# Patient Record
Sex: FEMALE | Race: WHITE
[De-identification: names, ages, dates, MRNs, and addresses within clinical notes are randomized per-mention and may not be internally consistent; named-entity substitution may affect disease eponyms.]

---

## 2017-12-06 ENCOUNTER — HOSPITAL ENCOUNTER (OUTPATIENT)
Dept: HOSPITAL 53 - M LAB REF | Age: 77
End: 2017-12-06
Attending: UROLOGY
Payer: MEDICARE

## 2017-12-06 DIAGNOSIS — R15.9: Primary | ICD-10-CM

## 2017-12-06 DIAGNOSIS — Z79.899: ICD-10-CM

## 2018-01-25 ENCOUNTER — HOSPITAL ENCOUNTER (OUTPATIENT)
Dept: HOSPITAL 53 - M SDC | Age: 78
Discharge: HOME | End: 2018-01-25
Attending: UROLOGY
Payer: MEDICARE

## 2018-01-25 DIAGNOSIS — I25.2: ICD-10-CM

## 2018-01-25 DIAGNOSIS — E03.9: ICD-10-CM

## 2018-01-25 DIAGNOSIS — K21.9: ICD-10-CM

## 2018-01-25 DIAGNOSIS — E11.9: ICD-10-CM

## 2018-01-25 DIAGNOSIS — Z88.8: ICD-10-CM

## 2018-01-25 DIAGNOSIS — Z79.899: ICD-10-CM

## 2018-01-25 DIAGNOSIS — G47.30: ICD-10-CM

## 2018-01-25 DIAGNOSIS — R15.9: Primary | ICD-10-CM

## 2018-01-25 DIAGNOSIS — N39.3: ICD-10-CM

## 2018-01-25 DIAGNOSIS — Z98.61: ICD-10-CM

## 2018-01-25 DIAGNOSIS — Z88.2: ICD-10-CM

## 2018-01-25 DIAGNOSIS — Z79.51: ICD-10-CM

## 2018-01-25 DIAGNOSIS — Z79.82: ICD-10-CM

## 2018-01-25 DIAGNOSIS — E78.4: ICD-10-CM

## 2018-01-25 DIAGNOSIS — J45.909: ICD-10-CM

## 2018-01-25 DIAGNOSIS — I10: ICD-10-CM

## 2018-01-25 DIAGNOSIS — I25.10: ICD-10-CM

## 2018-01-25 LAB — GLUCOSE BLDC GLUCOMTR-MCNC: 109 MG/DL (ref 83–110)

## 2018-01-25 PROCEDURE — 64590 INS/RPL PRPH SAC/GSTR NPG/R: CPT

## 2018-01-25 RX ADMIN — LIDOCAINE HYDROCHLORIDE 1 ML: 10 INJECTION, SOLUTION EPIDURAL; INFILTRATION; INTRACAUDAL; PERINEURAL at 08:12

## 2018-01-25 RX ADMIN — CEFAZOLIN SODIUM 1 GM: 1 INJECTION, POWDER, FOR SOLUTION INTRAMUSCULAR; INTRAVENOUS at 08:13

## 2018-02-01 ENCOUNTER — HOSPITAL ENCOUNTER (OUTPATIENT)
Dept: HOSPITAL 53 - M SDC | Age: 78
Discharge: HOME | End: 2018-02-01
Attending: UROLOGY
Payer: MEDICARE

## 2018-02-01 DIAGNOSIS — E11.9: ICD-10-CM

## 2018-02-01 DIAGNOSIS — I25.10: ICD-10-CM

## 2018-02-01 DIAGNOSIS — G47.30: ICD-10-CM

## 2018-02-01 DIAGNOSIS — R80.9: ICD-10-CM

## 2018-02-01 DIAGNOSIS — Z88.5: ICD-10-CM

## 2018-02-01 DIAGNOSIS — Z95.5: ICD-10-CM

## 2018-02-01 DIAGNOSIS — K21.9: ICD-10-CM

## 2018-02-01 DIAGNOSIS — Z79.899: ICD-10-CM

## 2018-02-01 DIAGNOSIS — Z79.51: ICD-10-CM

## 2018-02-01 DIAGNOSIS — E03.9: ICD-10-CM

## 2018-02-01 DIAGNOSIS — Z88.8: ICD-10-CM

## 2018-02-01 DIAGNOSIS — J45.909: ICD-10-CM

## 2018-02-01 DIAGNOSIS — Z88.2: ICD-10-CM

## 2018-02-01 DIAGNOSIS — I11.9: ICD-10-CM

## 2018-02-01 DIAGNOSIS — I25.2: ICD-10-CM

## 2018-02-01 DIAGNOSIS — R15.9: ICD-10-CM

## 2018-02-01 DIAGNOSIS — Z79.84: ICD-10-CM

## 2018-02-01 DIAGNOSIS — L40.9: ICD-10-CM

## 2018-02-01 DIAGNOSIS — E78.5: ICD-10-CM

## 2018-02-01 DIAGNOSIS — Z79.82: ICD-10-CM

## 2018-02-01 DIAGNOSIS — R32: Primary | ICD-10-CM

## 2018-02-01 DIAGNOSIS — Z91.048: ICD-10-CM

## 2018-02-01 LAB — GLUCOSE BLDC GLUCOMTR-MCNC: 106 MG/DL (ref 83–110)

## 2018-02-01 PROCEDURE — 64590 INS/RPL PRPH SAC/GSTR NPG/R: CPT

## 2018-02-01 RX ADMIN — SODIUM CHLORIDE, POTASSIUM CHLORIDE, SODIUM LACTATE AND CALCIUM CHLORIDE 1 MLS/HR: 600; 310; 30; 20 INJECTION, SOLUTION INTRAVENOUS at 10:15

## 2018-02-01 RX ADMIN — CEFAZOLIN SODIUM 1 GM: 1 INJECTION, POWDER, FOR SOLUTION INTRAMUSCULAR; INTRAVENOUS at 11:54

## 2018-02-01 RX ADMIN — LIDOCAINE HYDROCHLORIDE 1 ML: 10 INJECTION, SOLUTION EPIDURAL; INFILTRATION; INTRACAUDAL; PERINEURAL at 11:50

## 2018-02-16 ENCOUNTER — HOSPITAL ENCOUNTER (OUTPATIENT)
Dept: HOSPITAL 53 - M LAB REF | Age: 78
End: 2018-02-16
Attending: UROLOGY
Payer: MEDICARE

## 2018-02-16 DIAGNOSIS — R15.9: Primary | ICD-10-CM

## 2018-02-16 LAB
AMORPH SED URNS QL MICRO: (no result)
APPEARANCE, URINE: (no result)
BACTERIA UR QL AUTO: (no result)
BILIRUBIN, URINE AUTO: NEGATIVE
BLOOD, URINE BLOOD: NEGATIVE
CAOX CRY URNS QL MICRO: (no result)
GLUCOSE, URINE (UA) AUTO: NEGATIVE MG/DL
KETONE, URINE AUTO: NEGATIVE MG/DL
LEUKOCYTE ESTERASE UR QL STRIP.AUTO: (no result)
MUCUS, URINE: (no result)
NITRITE, URINE AUTO: NEGATIVE
PH,URINE: 6 UNITS (ref 5–9)
PROT UR QL STRIP.AUTO: NEGATIVE MG/DL
RBC, URINE AUTO: 5 /HPF (ref 0–3)
SPECIFIC GRAVITY URINE AUTO: 1.02 (ref 1–1.03)
SQUAMOUS #/AREA URNS AUTO: 7 /HPF (ref 0–6)
TRANSITIONAL EPITHELIAL AUTO: 3 /HPF
UROBILINOGEN, URINE AUTO: 2 MG/DL (ref 0–2)
WBC, URINE AUTO: 9 /HPF (ref 0–3)

## 2018-02-16 PROCEDURE — 81001 URINALYSIS AUTO W/SCOPE: CPT

## 2018-04-18 ENCOUNTER — HOSPITAL ENCOUNTER (OUTPATIENT)
Dept: HOSPITAL 53 - M LAB REF | Age: 78
End: 2018-04-18
Attending: UROLOGY
Payer: MEDICARE

## 2018-04-18 DIAGNOSIS — N39.41: Primary | ICD-10-CM

## 2018-04-18 LAB
APPEARANCE, URINE: (no result)
BACTERIA UR QL AUTO: NEGATIVE
BILIRUBIN, URINE AUTO: NEGATIVE
BLOOD, URINE BLOOD: NEGATIVE
GLUCOSE, URINE (UA) AUTO: NEGATIVE MG/DL
KETONE, URINE AUTO: NEGATIVE MG/DL
LEUKOCYTE ESTERASE UR QL STRIP.AUTO: (no result)
MUCUS, URINE: (no result)
NITRITE, URINE AUTO: NEGATIVE
PH,URINE: 5 UNITS (ref 5–9)
PROT UR QL STRIP.AUTO: NEGATIVE MG/DL
RBC, URINE AUTO: 3 /HPF (ref 0–3)
SPECIFIC GRAVITY URINE AUTO: 1.01 (ref 1–1.03)
SQUAMOUS #/AREA URNS AUTO: 7 /HPF (ref 0–6)
TRANSITIONAL EPITHELIAL AUTO: 1 /HPF
UROBILINOGEN, URINE AUTO: 0.2 MG/DL (ref 0–2)
WBC, URINE AUTO: 5 /HPF (ref 0–3)

## 2018-04-18 PROCEDURE — 81001 URINALYSIS AUTO W/SCOPE: CPT

## 2018-05-10 ENCOUNTER — HOSPITAL ENCOUNTER (OUTPATIENT)
Dept: HOSPITAL 53 - M SDC | Age: 78
LOS: 1 days | Discharge: HOME | End: 2018-05-11
Attending: ORTHOPAEDIC SURGERY
Payer: MEDICARE

## 2018-05-10 DIAGNOSIS — M54.9: ICD-10-CM

## 2018-05-10 DIAGNOSIS — I11.9: ICD-10-CM

## 2018-05-10 DIAGNOSIS — R60.0: ICD-10-CM

## 2018-05-10 DIAGNOSIS — Z88.6: ICD-10-CM

## 2018-05-10 DIAGNOSIS — M75.111: Primary | ICD-10-CM

## 2018-05-10 DIAGNOSIS — Z79.899: ICD-10-CM

## 2018-05-10 DIAGNOSIS — E11.9: ICD-10-CM

## 2018-05-10 DIAGNOSIS — K21.9: ICD-10-CM

## 2018-05-10 DIAGNOSIS — F32.9: ICD-10-CM

## 2018-05-10 DIAGNOSIS — Z78.0: ICD-10-CM

## 2018-05-10 DIAGNOSIS — Z86.2: ICD-10-CM

## 2018-05-10 DIAGNOSIS — R23.3: ICD-10-CM

## 2018-05-10 DIAGNOSIS — Z88.8: ICD-10-CM

## 2018-05-10 DIAGNOSIS — J45.909: ICD-10-CM

## 2018-05-10 DIAGNOSIS — Z95.5: ICD-10-CM

## 2018-05-10 DIAGNOSIS — R06.83: ICD-10-CM

## 2018-05-10 DIAGNOSIS — E03.9: ICD-10-CM

## 2018-05-10 DIAGNOSIS — I25.10: ICD-10-CM

## 2018-05-10 DIAGNOSIS — Z79.82: ICD-10-CM

## 2018-05-10 DIAGNOSIS — Z87.440: ICD-10-CM

## 2018-05-10 DIAGNOSIS — E78.00: ICD-10-CM

## 2018-05-10 DIAGNOSIS — E66.9: ICD-10-CM

## 2018-05-10 DIAGNOSIS — Z88.5: ICD-10-CM

## 2018-05-10 DIAGNOSIS — Z87.891: ICD-10-CM

## 2018-05-10 DIAGNOSIS — R32: ICD-10-CM

## 2018-05-10 DIAGNOSIS — G47.33: ICD-10-CM

## 2018-05-10 DIAGNOSIS — M75.21: ICD-10-CM

## 2018-05-10 DIAGNOSIS — M12.9: ICD-10-CM

## 2018-05-10 DIAGNOSIS — H91.92: ICD-10-CM

## 2018-05-10 DIAGNOSIS — I25.2: ICD-10-CM

## 2018-05-10 LAB
ANION GAP: 7 MEQ/L (ref 8–16)
BLOOD UREA NITROGEN: 15 MG/DL (ref 7–18)
CALCIUM LEVEL: 9.4 MG/DL (ref 8.8–10.2)
CARBON DIOXIDE LEVEL: 28 MEQ/L (ref 21–32)
CHLORIDE LEVEL: 105 MEQ/L (ref 98–107)
CREATININE FOR GFR: 0.77 MG/DL (ref 0.55–1.3)
GFR SERPL CREATININE-BSD FRML MDRD: > 60 ML/MIN/{1.73_M2} (ref 39–?)
GLUCOSE BLDC GLUCOMTR-MCNC: 116 MG/DL (ref 83–110)
GLUCOSE BLDC GLUCOMTR-MCNC: 161 MG/DL (ref 83–110)
GLUCOSE, FASTING: 116 MG/DL (ref 70–100)
POTASSIUM SERUM: 4.2 MEQ/L (ref 3.5–5.1)
SODIUM LEVEL: 140 MEQ/L (ref 136–145)

## 2018-05-10 PROCEDURE — 29827 SHO ARTHRS SRG RT8TR CUF RPR: CPT

## 2018-05-10 RX ADMIN — RAMIPRIL 1 MG: 5 CAPSULE ORAL at 22:02

## 2018-05-10 RX ADMIN — SODIUM CHLORIDE, POTASSIUM CHLORIDE, SODIUM LACTATE AND CALCIUM CHLORIDE 1 MLS/HR: 600; 310; 30; 20 INJECTION, SOLUTION INTRAVENOUS at 20:08

## 2018-05-10 RX ADMIN — RANOLAZINE 1 MG: 500 TABLET, FILM COATED, EXTENDED RELEASE ORAL at 22:39

## 2018-05-10 RX ADMIN — ALBUTEROL SULFATE 1 PUFF: 90 AEROSOL, METERED RESPIRATORY (INHALATION) at 15:43

## 2018-05-10 RX ADMIN — FENTANYL CITRATE 1 MCG: 50 INJECTION, SOLUTION INTRAMUSCULAR; INTRAVENOUS at 07:21

## 2018-05-10 RX ADMIN — FLUTICASONE PROPIONATE AND SALMETEROL XINAFOATE 1 PUFF: 230; 21 AEROSOL, METERED RESPIRATORY (INHALATION) at 20:39

## 2018-05-10 RX ADMIN — FLUTICASONE PROPIONATE 1 SPRAY: 50 SPRAY, METERED NASAL at 22:39

## 2018-05-10 RX ADMIN — DEXTROSE MONOHYDRATE 1 MG: 50 INJECTION, SOLUTION INTRAVENOUS at 08:19

## 2018-05-10 RX ADMIN — SODIUM CHLORIDE, PRESERVATIVE FREE 1 ML: 5 INJECTION INTRAVENOUS at 22:03

## 2018-05-10 RX ADMIN — GABAPENTIN 1 MG: 300 CAPSULE ORAL at 22:00

## 2018-05-10 RX ADMIN — SODIUM CHLORIDE, POTASSIUM CHLORIDE, SODIUM LACTATE AND CALCIUM CHLORIDE 1 MLS/HR: 600; 310; 30; 20 INJECTION, SOLUTION INTRAVENOUS at 06:53

## 2018-05-10 RX ADMIN — TIOTROPIUM BROMIDE 1 INHALATION: 18 CAPSULE ORAL; RESPIRATORY (INHALATION) at 20:40

## 2018-05-10 RX ADMIN — ALBUTEROL SULFATE 1 MG: 2.5 SOLUTION RESPIRATORY (INHALATION) at 10:00

## 2018-05-10 RX ADMIN — MIDAZOLAM 1 MG: 1 INJECTION INTRAMUSCULAR; INTRAVENOUS at 07:21

## 2018-05-11 RX ADMIN — CETIRIZINE HYDROCHLORIDE 1 MG: 10 TABLET, FILM COATED ORAL at 09:09

## 2018-05-11 RX ADMIN — RANOLAZINE 1 MG: 500 TABLET, FILM COATED, EXTENDED RELEASE ORAL at 09:08

## 2018-05-11 RX ADMIN — FLUTICASONE PROPIONATE AND SALMETEROL XINAFOATE 1 PUFF: 230; 21 AEROSOL, METERED RESPIRATORY (INHALATION) at 07:27

## 2018-05-11 RX ADMIN — GABAPENTIN 1 MG: 300 CAPSULE ORAL at 09:11

## 2018-05-11 RX ADMIN — SODIUM CHLORIDE, PRESERVATIVE FREE 1 ML: 5 INJECTION INTRAVENOUS at 06:19

## 2018-05-11 RX ADMIN — PANTOPRAZOLE SODIUM 1 MG: 40 TABLET, DELAYED RELEASE ORAL at 09:11

## 2018-05-11 RX ADMIN — BISOPROLOL FUMARATE 1 MG: 5 TABLET ORAL at 09:11

## 2018-05-11 RX ADMIN — ACETAMINOPHEN 1 MG: 500 TABLET ORAL at 06:50

## 2018-05-11 RX ADMIN — LEVOTHYROXINE SODIUM 1 MCG: 112 TABLET ORAL at 06:18

## 2018-06-01 ENCOUNTER — HOSPITAL ENCOUNTER (OUTPATIENT)
Dept: HOSPITAL 53 - M LAB REF | Age: 78
End: 2018-06-01
Attending: UROLOGY
Payer: MEDICARE

## 2018-06-01 DIAGNOSIS — Z79.899: ICD-10-CM

## 2018-06-01 DIAGNOSIS — Z87.440: ICD-10-CM

## 2018-06-01 DIAGNOSIS — Z09: Primary | ICD-10-CM

## 2018-06-01 LAB
APPEARANCE, URINE: (no result)
BACTERIA UR QL AUTO: NEGATIVE
BILIRUBIN, URINE AUTO: NEGATIVE
BLOOD, URINE BLOOD: NEGATIVE
GLUCOSE, URINE (UA) AUTO: NEGATIVE MG/DL
KETONE, URINE AUTO: NEGATIVE MG/DL
LEUKOCYTE ESTERASE UR QL STRIP.AUTO: (no result)
MUCUS, URINE: (no result)
NITRITE, URINE AUTO: NEGATIVE
PH,URINE: 5 UNITS (ref 5–9)
PROT UR QL STRIP.AUTO: NEGATIVE MG/DL
RBC, URINE AUTO: 3 /HPF (ref 0–3)
SPECIFIC GRAVITY URINE AUTO: 1.02 (ref 1–1.03)
SQUAMOUS #/AREA URNS AUTO: 1 /HPF (ref 0–6)
UROBILINOGEN, URINE AUTO: 0.2 MG/DL (ref 0–2)
WBC, URINE AUTO: 1 /HPF (ref 0–3)

## 2018-06-01 PROCEDURE — 81001 URINALYSIS AUTO W/SCOPE: CPT

## 2018-10-30 ENCOUNTER — HOSPITAL ENCOUNTER (OUTPATIENT)
Dept: HOSPITAL 53 - M LAB | Age: 78
End: 2018-10-30
Attending: ORTHOPAEDIC SURGERY
Payer: MEDICARE

## 2018-10-30 DIAGNOSIS — M17.12: ICD-10-CM

## 2018-10-30 DIAGNOSIS — Z01.818: Primary | ICD-10-CM

## 2018-10-30 LAB
ALBUMIN/GLOBULIN RATIO: 1.03 (ref 1–1.93)
ALBUMIN: 3.6 GM/DL (ref 3.2–5.2)
ALKALINE PHOSPHATASE: 62 U/L (ref 45–117)
ALT SERPL W P-5'-P-CCNC: 21 U/L (ref 12–78)
ANION GAP: 3 MEQ/L (ref 8–16)
AST SERPL-CCNC: 25 U/L (ref 7–37)
BILIRUBIN,TOTAL: 0.5 MG/DL (ref 0.2–1)
BLOOD UREA NITROGEN: 17 MG/DL (ref 7–18)
CALCIUM LEVEL: 9.4 MG/DL (ref 8.8–10.2)
CARBON DIOXIDE LEVEL: 32 MEQ/L (ref 21–32)
CHLORIDE LEVEL: 106 MEQ/L (ref 98–107)
CREATININE FOR GFR: 0.74 MG/DL (ref 0.55–1.3)
ERYTHROCYTE SEDIMENTATION RATE: 48 MM/HR (ref 0–30)
GFR SERPL CREATININE-BSD FRML MDRD: > 60 ML/MIN/{1.73_M2} (ref 39–?)
GLUCOSE, FASTING: 107 MG/DL (ref 70–100)
HEMATOCRIT: 35.4 % (ref 36–47)
HEMOGLOBIN: 11.4 G/DL (ref 12–15.5)
INR: 0.99
MEAN CORPUSCULAR HEMOGLOBIN: 30.6 PG (ref 27–33)
MEAN CORPUSCULAR HGB CONC: 32.2 G/DL (ref 32–36.5)
MEAN CORPUSCULAR VOLUME: 95.2 FL (ref 80–96)
NRBC BLD AUTO-RTO: 0 % (ref 0–0)
PLATELET COUNT, AUTOMATED: 273 10^3/UL (ref 150–450)
POTASSIUM SERUM: 4.7 MEQ/L (ref 3.5–5.1)
PROTHROMBIN TIME: 13.2 SECONDS (ref 12.1–14.4)
RED BLOOD COUNT: 3.72 10^6/UL (ref 4–5.4)
RED CELL DISTRIBUTION WIDTH: 15.8 % (ref 11.5–14.5)
SODIUM LEVEL: 141 MEQ/L (ref 136–145)
TOTAL PROTEIN: 7.1 GM/DL (ref 6.4–8.2)
WHITE BLOOD COUNT: 7.3 10^3/UL (ref 4–10)

## 2018-10-30 PROCEDURE — 71046 X-RAY EXAM CHEST 2 VIEWS: CPT

## 2018-11-27 ENCOUNTER — HOSPITAL ENCOUNTER (INPATIENT)
Dept: HOSPITAL 53 - M OR | Age: 78
LOS: 3 days | Discharge: NURSING, REHAB OR LONG-TERM CARE | DRG: 470 | End: 2018-11-30
Attending: ORTHOPAEDIC SURGERY | Admitting: ORTHOPAEDIC SURGERY
Payer: MEDICARE

## 2018-11-27 DIAGNOSIS — I10: ICD-10-CM

## 2018-11-27 DIAGNOSIS — R26.89: ICD-10-CM

## 2018-11-27 DIAGNOSIS — Z79.82: ICD-10-CM

## 2018-11-27 DIAGNOSIS — Z95.5: ICD-10-CM

## 2018-11-27 DIAGNOSIS — Z79.899: ICD-10-CM

## 2018-11-27 DIAGNOSIS — Z79.84: ICD-10-CM

## 2018-11-27 DIAGNOSIS — J45.909: ICD-10-CM

## 2018-11-27 DIAGNOSIS — M17.12: Primary | ICD-10-CM

## 2018-11-27 DIAGNOSIS — R33.9: ICD-10-CM

## 2018-11-27 DIAGNOSIS — F39: ICD-10-CM

## 2018-11-27 DIAGNOSIS — E03.9: ICD-10-CM

## 2018-11-27 DIAGNOSIS — G47.33: ICD-10-CM

## 2018-11-27 DIAGNOSIS — T40.605A: ICD-10-CM

## 2018-11-27 DIAGNOSIS — E78.00: ICD-10-CM

## 2018-11-27 DIAGNOSIS — I95.9: ICD-10-CM

## 2018-11-27 DIAGNOSIS — E11.40: ICD-10-CM

## 2018-11-27 DIAGNOSIS — K21.9: ICD-10-CM

## 2018-11-27 DIAGNOSIS — T46.5X5A: ICD-10-CM

## 2018-11-27 DIAGNOSIS — Z90.49: ICD-10-CM

## 2018-11-27 DIAGNOSIS — I25.2: ICD-10-CM

## 2018-11-27 DIAGNOSIS — I25.10: ICD-10-CM

## 2018-11-27 DIAGNOSIS — R09.02: ICD-10-CM

## 2018-11-27 DIAGNOSIS — J44.9: ICD-10-CM

## 2018-11-27 LAB
GLUCOSE BLDC GLUCOMTR-MCNC: 118 MG/DL (ref 83–110)
GLUCOSE BLDC GLUCOMTR-MCNC: 142 MG/DL (ref 83–110)

## 2018-11-27 PROCEDURE — 0SRD0J9 REPLACEMENT OF LEFT KNEE JOINT WITH SYNTHETIC SUBSTITUTE, CEMENTED, OPEN APPROACH: ICD-10-PCS

## 2018-11-27 RX ADMIN — CEFAZOLIN SODIUM 1 GM: 1 INJECTION, POWDER, FOR SOLUTION INTRAMUSCULAR; INTRAVENOUS at 11:21

## 2018-11-27 RX ADMIN — FLUTICASONE PROPIONATE AND SALMETEROL XINAFOATE 1 PUFF: 230; 21 AEROSOL, METERED RESPIRATORY (INHALATION) at 19:53

## 2018-11-27 RX ADMIN — FENTANYL CITRATE 1 MCG: 50 INJECTION, SOLUTION INTRAMUSCULAR; INTRAVENOUS at 09:34

## 2018-11-27 RX ADMIN — DEXTROSE MONOHYDRATE 1 MG: 50 INJECTION, SOLUTION INTRAVENOUS at 11:20

## 2018-11-27 RX ADMIN — BUPIVACAINE HYDROCHLORIDE 1 ML: 2.5 INJECTION, SOLUTION EPIDURAL; INFILTRATION; INTRACAUDAL at 11:18

## 2018-11-27 RX ADMIN — ONDANSETRON 1 MG: 2 INJECTION INTRAMUSCULAR; INTRAVENOUS at 23:34

## 2018-11-27 RX ADMIN — GABAPENTIN 1 MG: 300 CAPSULE ORAL at 20:48

## 2018-11-27 RX ADMIN — BUPIVACAINE 1 ML: 13.3 INJECTION, SUSPENSION, LIPOSOMAL INFILTRATION at 11:21

## 2018-11-27 RX ADMIN — SODIUM CHLORIDE, POTASSIUM CHLORIDE, SODIUM LACTATE AND CALCIUM CHLORIDE 1 MLS/HR: 600; 310; 30; 20 INJECTION, SOLUTION INTRAVENOUS at 13:15

## 2018-11-27 RX ADMIN — RANOLAZINE 1 MG: 500 TABLET, FILM COATED, EXTENDED RELEASE ORAL at 20:48

## 2018-11-27 RX ADMIN — SODIUM CHLORIDE, POTASSIUM CHLORIDE, SODIUM LACTATE AND CALCIUM CHLORIDE 1 MLS/HR: 600; 310; 30; 20 INJECTION, SOLUTION INTRAVENOUS at 09:00

## 2018-11-27 RX ADMIN — MIDAZOLAM 1 MG: 1 INJECTION INTRAMUSCULAR; INTRAVENOUS at 09:34

## 2018-11-27 RX ADMIN — ACETAMINOPHEN 1 MG: 325 TABLET ORAL at 22:33

## 2018-11-28 LAB
ANION GAP: 7 MEQ/L (ref 8–16)
BLOOD UREA NITROGEN: 17 MG/DL (ref 7–18)
CALCIUM LEVEL: 8.7 MG/DL (ref 8.8–10.2)
CARBON DIOXIDE LEVEL: 27 MEQ/L (ref 21–32)
CHLORIDE LEVEL: 101 MEQ/L (ref 98–107)
CK MB CFR.DF SERPL CALC: 0.82
CK SERPL-CCNC: 291 U/L (ref 26–192)
CK-MB VALUE MASS: 2 NG/ML (ref ?–3.6)
CREATININE FOR GFR: 1.32 MG/DL (ref 0.55–1.3)
GFR SERPL CREATININE-BSD FRML MDRD: 41.5 ML/MIN/{1.73_M2} (ref 39–?)
GLUCOSE BLDC GLUCOMTR-MCNC: 127 MG/DL (ref 83–110)
GLUCOSE BLDC GLUCOMTR-MCNC: 138 MG/DL (ref 83–110)
GLUCOSE BLDC GLUCOMTR-MCNC: 144 MG/DL (ref 83–110)
GLUCOSE, FASTING: 122 MG/DL (ref 70–100)
HEMATOCRIT: 29.5 % (ref 36–47)
HEMATOCRIT: 33.1 % (ref 36–47)
HEMOGLOBIN: 10.5 G/DL (ref 12–15.5)
HEMOGLOBIN: 9.5 G/DL (ref 12–15.5)
INR: 1.13
LACTIC ACID SEPSIS PROTOCOL: 1.6 MMOL/L (ref 0.4–2)
LACTIC ACID SEPSIS PROTOCOL: 3.2 MMOL/L (ref 0.4–2)
MEAN CORPUSCULAR HEMOGLOBIN: 30.3 PG (ref 27–33)
MEAN CORPUSCULAR HEMOGLOBIN: 30.7 PG (ref 27–33)
MEAN CORPUSCULAR HGB CONC: 31.7 G/DL (ref 32–36.5)
MEAN CORPUSCULAR HGB CONC: 32.2 G/DL (ref 32–36.5)
MEAN CORPUSCULAR VOLUME: 95.5 FL (ref 80–96)
MEAN CORPUSCULAR VOLUME: 95.7 FL (ref 80–96)
NRBC BLD AUTO-RTO: 0 % (ref 0–0)
NRBC BLD AUTO-RTO: 0.2 % (ref 0–0)
PLATELET COUNT, AUTOMATED: 258 10^3/UL (ref 150–450)
PLATELET COUNT, AUTOMATED: 282 10^3/UL (ref 150–450)
POTASSIUM SERUM: 4.6 MEQ/L (ref 3.5–5.1)
PROTHROMBIN TIME: 14.7 SECONDS (ref 12.1–14.4)
RED BLOOD COUNT: 3.09 10^6/UL (ref 4–5.4)
RED BLOOD COUNT: 3.46 10^6/UL (ref 4–5.4)
RED CELL DISTRIBUTION WIDTH: 16.5 % (ref 11.5–14.5)
RED CELL DISTRIBUTION WIDTH: 16.8 % (ref 11.5–14.5)
SODIUM LEVEL: 135 MEQ/L (ref 136–145)
THYROID STIMULATING HORMONE: 1.51 UIU/ML (ref 0.36–3.74)
TROPONIN I: < 0.02 NG/ML (ref ?–0.1)
WHITE BLOOD COUNT: 11.3 10^3/UL (ref 4–10)
WHITE BLOOD COUNT: 11.7 10^3/UL (ref 4–10)

## 2018-11-28 RX ADMIN — SODIUM CHLORIDE 1 MLS/HR: 9 INJECTION, SOLUTION INTRAVENOUS at 18:17

## 2018-11-28 RX ADMIN — RANOLAZINE 1 MG: 500 TABLET, FILM COATED, EXTENDED RELEASE ORAL at 20:53

## 2018-11-28 RX ADMIN — BISOPROLOL FUMARATE 1 MG: 5 TABLET ORAL at 08:27

## 2018-11-28 RX ADMIN — INSULIN LISPRO 2 UNITS: 100 INJECTION, SOLUTION INTRAVENOUS; SUBCUTANEOUS at 18:16

## 2018-11-28 RX ADMIN — PANTOPRAZOLE SODIUM 1 MG: 40 TABLET, DELAYED RELEASE ORAL at 08:28

## 2018-11-28 RX ADMIN — ACETAMINOPHEN 1 MG: 500 TABLET ORAL at 22:12

## 2018-11-28 RX ADMIN — FLUTICASONE PROPIONATE 1 SPRAY: 50 SPRAY, METERED NASAL at 08:29

## 2018-11-28 RX ADMIN — ASPIRIN 1 MG: 81 TABLET ORAL at 08:29

## 2018-11-28 RX ADMIN — RIVAROXABAN 1 MG: 10 TABLET, FILM COATED ORAL at 22:12

## 2018-11-28 RX ADMIN — RANOLAZINE 1 MG: 500 TABLET, FILM COATED, EXTENDED RELEASE ORAL at 08:28

## 2018-11-28 RX ADMIN — LEVOTHYROXINE SODIUM 1 MCG: 112 TABLET ORAL at 05:06

## 2018-11-28 RX ADMIN — FLUTICASONE PROPIONATE AND SALMETEROL XINAFOATE 1 PUFF: 230; 21 AEROSOL, METERED RESPIRATORY (INHALATION) at 20:47

## 2018-11-28 RX ADMIN — ACETAMINOPHEN 1 MG: 500 TABLET ORAL at 10:10

## 2018-11-28 RX ADMIN — IPRATROPIUM BROMIDE AND ALBUTEROL SULFATE 1 ML: .5; 3 SOLUTION RESPIRATORY (INHALATION) at 20:00

## 2018-11-28 RX ADMIN — SODIUM CHLORIDE, POTASSIUM CHLORIDE, SODIUM LACTATE AND CALCIUM CHLORIDE 1 MLS/HR: 600; 310; 30; 20 INJECTION, SOLUTION INTRAVENOUS at 01:43

## 2018-11-28 RX ADMIN — DOCUSATE SODIUM,SENNOSIDES 1 TAB: 50; 8.6 TABLET, FILM COATED ORAL at 20:53

## 2018-11-28 RX ADMIN — GABAPENTIN 1 MG: 300 CAPSULE ORAL at 20:53

## 2018-11-28 RX ADMIN — SODIUM CHLORIDE 1 ML: 9 INJECTION, SOLUTION INTRAVENOUS at 15:50

## 2018-11-28 RX ADMIN — DOCUSATE SODIUM,SENNOSIDES 1 TAB: 50; 8.6 TABLET, FILM COATED ORAL at 08:28

## 2018-11-28 RX ADMIN — NALOXONE HYDROCHLORIDE 1 MG: 0.4 INJECTION, SOLUTION INTRAMUSCULAR; INTRAVENOUS; SUBCUTANEOUS at 09:47

## 2018-11-28 RX ADMIN — NALOXONE HYDROCHLORIDE 1 MG: 0.4 INJECTION, SOLUTION INTRAMUSCULAR; INTRAVENOUS; SUBCUTANEOUS at 13:40

## 2018-11-28 RX ADMIN — MAGNESIUM HYDROXIDE 1 ML: 400 SUSPENSION ORAL at 08:27

## 2018-11-28 RX ADMIN — RAMIPRIL 1 MG: 5 CAPSULE ORAL at 08:29

## 2018-11-28 RX ADMIN — SODIUM CHLORIDE 1 MLS/HR: 9 INJECTION, SOLUTION INTRAVENOUS at 13:41

## 2018-11-28 RX ADMIN — POLYETHYLENE GLYCOL 3350 1 PKT: 17 POWDER, FOR SOLUTION ORAL at 08:28

## 2018-11-28 RX ADMIN — FLUTICASONE PROPIONATE AND SALMETEROL XINAFOATE 1 PUFF: 230; 21 AEROSOL, METERED RESPIRATORY (INHALATION) at 08:06

## 2018-11-28 RX ADMIN — SODIUM CHLORIDE 1 MLS/HR: 9 INJECTION, SOLUTION INTRAVENOUS at 17:12

## 2018-11-28 RX ADMIN — GABAPENTIN 1 MG: 300 CAPSULE ORAL at 08:28

## 2018-11-28 RX ADMIN — CETIRIZINE HYDROCHLORIDE 1 MG: 10 TABLET, FILM COATED ORAL at 08:28

## 2018-11-28 RX ADMIN — ISOSORBIDE MONONITRATE 1 MG: 30 TABLET, EXTENDED RELEASE ORAL at 08:29

## 2018-11-28 RX ADMIN — TIOTROPIUM BROMIDE 1 INHALATION: 18 CAPSULE ORAL; RESPIRATORY (INHALATION) at 08:07

## 2018-11-29 LAB
ANION GAP: 5 MEQ/L (ref 8–16)
ANION GAP: 7 MEQ/L (ref 8–16)
BLOOD UREA NITROGEN: 15 MG/DL (ref 7–18)
BLOOD UREA NITROGEN: 17 MG/DL (ref 7–18)
CALCIUM LEVEL: 8.1 MG/DL (ref 8.8–10.2)
CALCIUM LEVEL: 8.5 MG/DL (ref 8.8–10.2)
CARBON DIOXIDE LEVEL: 26 MEQ/L (ref 21–32)
CARBON DIOXIDE LEVEL: 28 MEQ/L (ref 21–32)
CHLORIDE LEVEL: 105 MEQ/L (ref 98–107)
CHLORIDE LEVEL: 105 MEQ/L (ref 98–107)
CREATININE FOR GFR: 0.81 MG/DL (ref 0.55–1.3)
CREATININE FOR GFR: 1.02 MG/DL (ref 0.55–1.3)
GFR SERPL CREATININE-BSD FRML MDRD: 55.9 ML/MIN/{1.73_M2} (ref 39–?)
GFR SERPL CREATININE-BSD FRML MDRD: > 60 ML/MIN/{1.73_M2} (ref 39–?)
GLUCOSE BLDC GLUCOMTR-MCNC: 133 MG/DL (ref 83–110)
GLUCOSE BLDC GLUCOMTR-MCNC: 139 MG/DL (ref 83–110)
GLUCOSE, FASTING: 129 MG/DL (ref 70–100)
GLUCOSE, FASTING: 137 MG/DL (ref 70–100)
HEMATOCRIT: 30 % (ref 36–47)
HEMOGLOBIN: 9.5 G/DL (ref 12–15.5)
MEAN CORPUSCULAR HEMOGLOBIN: 30.4 PG (ref 27–33)
MEAN CORPUSCULAR HGB CONC: 31.7 G/DL (ref 32–36.5)
MEAN CORPUSCULAR VOLUME: 95.8 FL (ref 80–96)
NRBC BLD AUTO-RTO: 0 % (ref 0–0)
PLATELET COUNT, AUTOMATED: 215 10^3/UL (ref 150–450)
POTASSIUM SERUM: 4.4 MEQ/L (ref 3.5–5.1)
POTASSIUM SERUM: 4.5 MEQ/L (ref 3.5–5.1)
RED BLOOD COUNT: 3.13 10^6/UL (ref 4–5.4)
RED CELL DISTRIBUTION WIDTH: 16.4 % (ref 11.5–14.5)
SODIUM LEVEL: 138 MEQ/L (ref 136–145)
SODIUM LEVEL: 138 MEQ/L (ref 136–145)
WHITE BLOOD COUNT: 9.7 10^3/UL (ref 4–10)

## 2018-11-29 RX ADMIN — GABAPENTIN 1 MG: 300 CAPSULE ORAL at 20:51

## 2018-11-29 RX ADMIN — FLUTICASONE PROPIONATE 1 SPRAY: 50 SPRAY, METERED NASAL at 08:20

## 2018-11-29 RX ADMIN — FLUTICASONE PROPIONATE AND SALMETEROL XINAFOATE 1 PUFF: 230; 21 AEROSOL, METERED RESPIRATORY (INHALATION) at 19:39

## 2018-11-29 RX ADMIN — ACETAMINOPHEN 1 MG: 500 TABLET ORAL at 08:20

## 2018-11-29 RX ADMIN — IPRATROPIUM BROMIDE AND ALBUTEROL SULFATE 1 ML: .5; 3 SOLUTION RESPIRATORY (INHALATION) at 08:00

## 2018-11-29 RX ADMIN — IPRATROPIUM BROMIDE AND ALBUTEROL SULFATE 1 ML: .5; 3 SOLUTION RESPIRATORY (INHALATION) at 15:40

## 2018-11-29 RX ADMIN — RANOLAZINE 1 MG: 500 TABLET, FILM COATED, EXTENDED RELEASE ORAL at 08:17

## 2018-11-29 RX ADMIN — ASPIRIN 1 MG: 81 TABLET ORAL at 08:20

## 2018-11-29 RX ADMIN — RIVAROXABAN 1 MG: 10 TABLET, FILM COATED ORAL at 17:46

## 2018-11-29 RX ADMIN — IPRATROPIUM BROMIDE AND ALBUTEROL SULFATE 1 ML: .5; 3 SOLUTION RESPIRATORY (INHALATION) at 12:22

## 2018-11-29 RX ADMIN — RANOLAZINE 1 MG: 500 TABLET, FILM COATED, EXTENDED RELEASE ORAL at 20:51

## 2018-11-29 RX ADMIN — GABAPENTIN 1 MG: 300 CAPSULE ORAL at 08:18

## 2018-11-29 RX ADMIN — POLYETHYLENE GLYCOL 3350 1 PKT: 17 POWDER, FOR SOLUTION ORAL at 08:16

## 2018-11-29 RX ADMIN — ACETAMINOPHEN 1 MG: 500 TABLET ORAL at 17:46

## 2018-11-29 RX ADMIN — TIOTROPIUM BROMIDE 1 INHALATION: 18 CAPSULE ORAL; RESPIRATORY (INHALATION) at 07:20

## 2018-11-29 RX ADMIN — INSULIN LISPRO 2 UNITS: 100 INJECTION, SOLUTION INTRAVENOUS; SUBCUTANEOUS at 17:46

## 2018-11-29 RX ADMIN — DOCUSATE SODIUM,SENNOSIDES 1 TAB: 50; 8.6 TABLET, FILM COATED ORAL at 20:51

## 2018-11-29 RX ADMIN — PANTOPRAZOLE SODIUM 1 MG: 40 TABLET, DELAYED RELEASE ORAL at 08:17

## 2018-11-29 RX ADMIN — BISOPROLOL FUMARATE 1 MG: 5 TABLET ORAL at 08:20

## 2018-11-29 RX ADMIN — FLUTICASONE PROPIONATE AND SALMETEROL XINAFOATE 1 PUFF: 230; 21 AEROSOL, METERED RESPIRATORY (INHALATION) at 07:20

## 2018-11-29 RX ADMIN — INSULIN LISPRO 2 UNITS: 100 INJECTION, SOLUTION INTRAVENOUS; SUBCUTANEOUS at 14:06

## 2018-11-29 RX ADMIN — CETIRIZINE HYDROCHLORIDE 1 MG: 10 TABLET, FILM COATED ORAL at 08:18

## 2018-11-29 RX ADMIN — MAGNESIUM HYDROXIDE 1 ML: 400 SUSPENSION ORAL at 08:16

## 2018-11-29 RX ADMIN — INSULIN LISPRO 2 UNITS: 100 INJECTION, SOLUTION INTRAVENOUS; SUBCUTANEOUS at 08:17

## 2018-11-29 RX ADMIN — LEVOTHYROXINE SODIUM 1 MCG: 112 TABLET ORAL at 05:33

## 2018-11-29 RX ADMIN — DOCUSATE SODIUM,SENNOSIDES 1 TAB: 50; 8.6 TABLET, FILM COATED ORAL at 08:20

## 2018-11-29 RX ADMIN — IPRATROPIUM BROMIDE AND ALBUTEROL SULFATE 1 ML: .5; 3 SOLUTION RESPIRATORY (INHALATION) at 12:18

## 2018-11-30 ENCOUNTER — HOSPITAL ENCOUNTER (INPATIENT)
Dept: HOSPITAL 53 - M PM&R | Age: 78
LOS: 14 days | Discharge: HOME | DRG: 560 | End: 2018-12-14
Attending: PHYSICAL MEDICINE & REHABILITATION | Admitting: PHYSICAL MEDICINE & REHABILITATION
Payer: MEDICARE

## 2018-11-30 DIAGNOSIS — Z79.84: ICD-10-CM

## 2018-11-30 DIAGNOSIS — K21.9: ICD-10-CM

## 2018-11-30 DIAGNOSIS — Z88.6: ICD-10-CM

## 2018-11-30 DIAGNOSIS — Z95.5: ICD-10-CM

## 2018-11-30 DIAGNOSIS — R33.9: ICD-10-CM

## 2018-11-30 DIAGNOSIS — E66.01: ICD-10-CM

## 2018-11-30 DIAGNOSIS — K59.00: ICD-10-CM

## 2018-11-30 DIAGNOSIS — I25.10: ICD-10-CM

## 2018-11-30 DIAGNOSIS — Z79.899: ICD-10-CM

## 2018-11-30 DIAGNOSIS — I25.2: ICD-10-CM

## 2018-11-30 DIAGNOSIS — J30.9: ICD-10-CM

## 2018-11-30 DIAGNOSIS — I48.91: ICD-10-CM

## 2018-11-30 DIAGNOSIS — I11.9: ICD-10-CM

## 2018-11-30 DIAGNOSIS — Z96.652: ICD-10-CM

## 2018-11-30 DIAGNOSIS — Z79.01: ICD-10-CM

## 2018-11-30 DIAGNOSIS — J44.9: ICD-10-CM

## 2018-11-30 DIAGNOSIS — R53.81: ICD-10-CM

## 2018-11-30 DIAGNOSIS — E11.9: ICD-10-CM

## 2018-11-30 DIAGNOSIS — Z88.2: ICD-10-CM

## 2018-11-30 DIAGNOSIS — M54.5: ICD-10-CM

## 2018-11-30 DIAGNOSIS — Z47.1: Primary | ICD-10-CM

## 2018-11-30 DIAGNOSIS — Z90.49: ICD-10-CM

## 2018-11-30 DIAGNOSIS — E03.9: ICD-10-CM

## 2018-11-30 DIAGNOSIS — R07.89: ICD-10-CM

## 2018-11-30 DIAGNOSIS — Z88.8: ICD-10-CM

## 2018-11-30 LAB
ANION GAP: 5 MEQ/L (ref 8–16)
BLOOD UREA NITROGEN: 13 MG/DL (ref 7–18)
CALCIUM LEVEL: 8.7 MG/DL (ref 8.8–10.2)
CARBON DIOXIDE LEVEL: 29 MEQ/L (ref 21–32)
CHLORIDE LEVEL: 103 MEQ/L (ref 98–107)
CREATININE FOR GFR: 0.73 MG/DL (ref 0.55–1.3)
GFR SERPL CREATININE-BSD FRML MDRD: > 60 ML/MIN/{1.73_M2} (ref 39–?)
GLUCOSE BLDC GLUCOMTR-MCNC: 153 MG/DL (ref 83–110)
GLUCOSE, FASTING: 123 MG/DL (ref 70–100)
HEMATOCRIT: 29.9 % (ref 36–47)
HEMOGLOBIN: 9.4 G/DL (ref 12–15.5)
MEAN CORPUSCULAR HEMOGLOBIN: 29.9 PG (ref 27–33)
MEAN CORPUSCULAR HGB CONC: 31.4 G/DL (ref 32–36.5)
MEAN CORPUSCULAR VOLUME: 95.2 FL (ref 80–96)
NRBC BLD AUTO-RTO: 0 % (ref 0–0)
PLATELET COUNT, AUTOMATED: 237 10^3/UL (ref 150–450)
POTASSIUM SERUM: 4.8 MEQ/L (ref 3.5–5.1)
RED BLOOD COUNT: 3.14 10^6/UL (ref 4–5.4)
RED CELL DISTRIBUTION WIDTH: 16.4 % (ref 11.5–14.5)
SODIUM LEVEL: 137 MEQ/L (ref 136–145)
WHITE BLOOD COUNT: 8.9 10^3/UL (ref 4–10)

## 2018-11-30 RX ADMIN — RANOLAZINE 1 MG: 500 TABLET, FILM COATED, EXTENDED RELEASE ORAL at 08:52

## 2018-11-30 RX ADMIN — TIOTROPIUM BROMIDE 1 INHALATION: 18 CAPSULE ORAL; RESPIRATORY (INHALATION) at 07:47

## 2018-11-30 RX ADMIN — FLUTICASONE PROPIONATE AND SALMETEROL XINAFOATE 1 PUFF: 230; 21 AEROSOL, METERED RESPIRATORY (INHALATION) at 22:15

## 2018-11-30 RX ADMIN — FLUTICASONE PROPIONATE AND SALMETEROL XINAFOATE 1 PUFF: 230; 21 AEROSOL, METERED RESPIRATORY (INHALATION) at 07:47

## 2018-11-30 RX ADMIN — BISOPROLOL FUMARATE 1 MG: 10 TABLET ORAL at 08:52

## 2018-11-30 RX ADMIN — LEVOTHYROXINE SODIUM 1 MCG: 112 TABLET ORAL at 05:37

## 2018-11-30 RX ADMIN — RANOLAZINE 1 MG: 500 TABLET, FILM COATED, EXTENDED RELEASE ORAL at 21:16

## 2018-11-30 RX ADMIN — ASPIRIN 1 MG: 81 TABLET ORAL at 08:53

## 2018-11-30 RX ADMIN — SENNOSIDES 1 TAB: 8.6 TABLET, FILM COATED ORAL at 21:16

## 2018-11-30 RX ADMIN — POLYETHYLENE GLYCOL 3350 1 PKT: 17 POWDER, FOR SOLUTION ORAL at 08:51

## 2018-11-30 RX ADMIN — CETIRIZINE HYDROCHLORIDE 1 MG: 10 TABLET, FILM COATED ORAL at 08:52

## 2018-11-30 RX ADMIN — ACETAMINOPHEN 1 MG: 500 TABLET ORAL at 04:02

## 2018-11-30 RX ADMIN — DOCUSATE SODIUM 1 MG: 100 CAPSULE, LIQUID FILLED ORAL at 21:16

## 2018-11-30 RX ADMIN — ALBUTEROL SULFATE 1 MG: 2.5 SOLUTION RESPIRATORY (INHALATION) at 16:00

## 2018-11-30 RX ADMIN — INSULIN LISPRO 4 UNITS: 100 INJECTION, SOLUTION INTRAVENOUS; SUBCUTANEOUS at 17:59

## 2018-11-30 RX ADMIN — MAGNESIUM HYDROXIDE 1 ML: 400 SUSPENSION ORAL at 08:51

## 2018-11-30 RX ADMIN — DOCUSATE SODIUM,SENNOSIDES 1 TAB: 50; 8.6 TABLET, FILM COATED ORAL at 08:52

## 2018-11-30 RX ADMIN — ACETAMINOPHEN 1 MG: 500 TABLET ORAL at 21:17

## 2018-11-30 RX ADMIN — FLUTICASONE PROPIONATE 1 SPRAY: 50 SPRAY, METERED NASAL at 08:53

## 2018-11-30 RX ADMIN — RIVAROXABAN 1 MG: 10 TABLET, FILM COATED ORAL at 21:16

## 2018-11-30 RX ADMIN — PANTOPRAZOLE SODIUM 1 MG: 40 TABLET, DELAYED RELEASE ORAL at 08:52

## 2018-11-30 RX ADMIN — GABAPENTIN 1 MG: 300 CAPSULE ORAL at 08:53

## 2018-11-30 RX ADMIN — INSULIN LISPRO 2 UNITS: 100 INJECTION, SOLUTION INTRAVENOUS; SUBCUTANEOUS at 08:51

## 2018-12-01 LAB
ALBUMIN/GLOBULIN RATIO: 0.6 (ref 1–1.93)
ALBUMIN: 2.6 GM/DL (ref 3.2–5.2)
ALKALINE PHOSPHATASE: 53 U/L (ref 45–117)
ALT SERPL W P-5'-P-CCNC: 15 U/L (ref 12–78)
ANION GAP: 5 MEQ/L (ref 8–16)
AST SERPL-CCNC: 21 U/L (ref 7–37)
BASO #: 0.1 10^3/UL (ref 0–0.2)
BASO %: 0.7 % (ref 0–1)
BILIRUBIN,TOTAL: 0.5 MG/DL (ref 0.2–1)
BLOOD UREA NITROGEN: 16 MG/DL (ref 7–18)
CALCIUM LEVEL: 9 MG/DL (ref 8.8–10.2)
CARBON DIOXIDE LEVEL: 31 MEQ/L (ref 21–32)
CHLORIDE LEVEL: 101 MEQ/L (ref 98–107)
CREATININE FOR GFR: 0.69 MG/DL (ref 0.55–1.3)
EOS #: 0.2 10^3/UL (ref 0–0.5)
EOSINOPHIL NFR BLD AUTO: 3 % (ref 0–3)
GFR SERPL CREATININE-BSD FRML MDRD: > 60 ML/MIN/{1.73_M2} (ref 39–?)
GLUCOSE BLDC GLUCOMTR-MCNC: 113 MG/DL (ref 83–110)
GLUCOSE BLDC GLUCOMTR-MCNC: 137 MG/DL (ref 83–110)
GLUCOSE BLDC GLUCOMTR-MCNC: 137 MG/DL (ref 83–110)
GLUCOSE BLDC GLUCOMTR-MCNC: 155 MG/DL (ref 83–110)
GLUCOSE BLDC GLUCOMTR-MCNC: 176 MG/DL (ref 83–110)
GLUCOSE, FASTING: 132 MG/DL (ref 70–100)
HEMATOCRIT: 29.3 % (ref 36–47)
HEMOGLOBIN: 9.3 G/DL (ref 12–15.5)
IMMATURE GRANULOCYTE %: 0.5 % (ref 0–3)
LYMPH #: 1.7 10^3/UL (ref 1.5–4.5)
LYMPH %: 22.6 % (ref 24–44)
MEAN CORPUSCULAR HEMOGLOBIN: 29.8 PG (ref 27–33)
MEAN CORPUSCULAR HGB CONC: 31.7 G/DL (ref 32–36.5)
MEAN CORPUSCULAR VOLUME: 93.9 FL (ref 80–96)
MONO #: 0.9 10^3/UL (ref 0–0.8)
MONO %: 11.4 % (ref 0–5)
NEUTROPHILS #: 4.7 10^3/UL (ref 1.8–7.7)
NEUTROPHILS %: 61.8 % (ref 36–66)
NRBC BLD AUTO-RTO: 0 % (ref 0–0)
PLATELET COUNT, AUTOMATED: 270 10^3/UL (ref 150–450)
POTASSIUM SERUM: 4.6 MEQ/L (ref 3.5–5.1)
RED BLOOD COUNT: 3.12 10^6/UL (ref 4–5.4)
RED CELL DISTRIBUTION WIDTH: 16.4 % (ref 11.5–14.5)
SODIUM LEVEL: 137 MEQ/L (ref 136–145)
TOTAL PROTEIN: 6.9 GM/DL (ref 6.4–8.2)
WHITE BLOOD COUNT: 7.6 10^3/UL (ref 4–10)

## 2018-12-01 RX ADMIN — RIVAROXABAN 1 MG: 10 TABLET, FILM COATED ORAL at 17:32

## 2018-12-01 RX ADMIN — RANOLAZINE 1 MG: 500 TABLET, FILM COATED, EXTENDED RELEASE ORAL at 09:01

## 2018-12-01 RX ADMIN — FLUTICASONE PROPIONATE AND SALMETEROL XINAFOATE 1 PUFF: 230; 21 AEROSOL, METERED RESPIRATORY (INHALATION) at 07:18

## 2018-12-01 RX ADMIN — DOCUSATE SODIUM 1 MG: 100 CAPSULE, LIQUID FILLED ORAL at 21:03

## 2018-12-01 RX ADMIN — Medication 1 UNITS: at 09:02

## 2018-12-01 RX ADMIN — PANTOPRAZOLE SODIUM 1 MG: 40 TABLET, DELAYED RELEASE ORAL at 09:02

## 2018-12-01 RX ADMIN — CETIRIZINE HYDROCHLORIDE 1 MG: 10 TABLET, FILM COATED ORAL at 08:58

## 2018-12-01 RX ADMIN — DOCUSATE SODIUM 1 MG: 100 CAPSULE, LIQUID FILLED ORAL at 09:02

## 2018-12-01 RX ADMIN — TIOTROPIUM BROMIDE 1 INHALATION: 18 CAPSULE ORAL; RESPIRATORY (INHALATION) at 07:19

## 2018-12-01 RX ADMIN — ALBUTEROL SULFATE 1 MG: 2.5 SOLUTION RESPIRATORY (INHALATION) at 15:01

## 2018-12-01 RX ADMIN — INSULIN LISPRO 2 UNITS: 100 INJECTION, SOLUTION INTRAVENOUS; SUBCUTANEOUS at 17:33

## 2018-12-01 RX ADMIN — ACETAMINOPHEN 1 MG: 500 TABLET ORAL at 03:53

## 2018-12-01 RX ADMIN — ALBUTEROL SULFATE 1 MG: 2.5 SOLUTION RESPIRATORY (INHALATION) at 08:00

## 2018-12-01 RX ADMIN — INSULIN LISPRO 2 UNITS: 100 INJECTION, SOLUTION INTRAVENOUS; SUBCUTANEOUS at 08:57

## 2018-12-01 RX ADMIN — GABAPENTIN 1 MG: 300 CAPSULE ORAL at 09:00

## 2018-12-01 RX ADMIN — RANOLAZINE 1 MG: 500 TABLET, FILM COATED, EXTENDED RELEASE ORAL at 21:02

## 2018-12-01 RX ADMIN — SENNOSIDES 1 TAB: 8.6 TABLET, FILM COATED ORAL at 21:02

## 2018-12-01 RX ADMIN — FLUTICASONE PROPIONATE AND SALMETEROL XINAFOATE 1 PUFF: 230; 21 AEROSOL, METERED RESPIRATORY (INHALATION) at 20:15

## 2018-12-01 RX ADMIN — ACETAMINOPHEN 1 MG: 500 TABLET ORAL at 11:58

## 2018-12-01 RX ADMIN — FLUTICASONE PROPIONATE 1 SPRAY: 50 SPRAY, METERED NASAL at 08:58

## 2018-12-01 RX ADMIN — MAGNESIUM HYDROXIDE 1 ML: 400 SUSPENSION ORAL at 09:17

## 2018-12-01 RX ADMIN — ALBUTEROL SULFATE 1 MG: 2.5 SOLUTION RESPIRATORY (INHALATION) at 00:00

## 2018-12-01 RX ADMIN — ACETAMINOPHEN 1 MG: 500 TABLET ORAL at 21:02

## 2018-12-01 RX ADMIN — ASPIRIN 1 MG: 81 TABLET ORAL at 08:57

## 2018-12-01 RX ADMIN — LEVOTHYROXINE SODIUM 1 MCG: 112 TABLET ORAL at 06:25

## 2018-12-01 RX ADMIN — INSULIN LISPRO 4 UNITS: 100 INJECTION, SOLUTION INTRAVENOUS; SUBCUTANEOUS at 11:58

## 2018-12-01 RX ADMIN — BISOPROLOL FUMARATE 1 MG: 5 TABLET ORAL at 09:01

## 2018-12-02 LAB
GLUCOSE BLDC GLUCOMTR-MCNC: 118 MG/DL (ref 83–110)
GLUCOSE BLDC GLUCOMTR-MCNC: 119 MG/DL (ref 83–110)
GLUCOSE BLDC GLUCOMTR-MCNC: 122 MG/DL (ref 83–110)
GLUCOSE BLDC GLUCOMTR-MCNC: 124 MG/DL (ref 83–110)

## 2018-12-02 RX ADMIN — CETIRIZINE HYDROCHLORIDE 1 MG: 10 TABLET, FILM COATED ORAL at 07:54

## 2018-12-02 RX ADMIN — RIVAROXABAN 1 MG: 10 TABLET, FILM COATED ORAL at 17:02

## 2018-12-02 RX ADMIN — Medication 1 UNITS: at 07:53

## 2018-12-02 RX ADMIN — PANTOPRAZOLE SODIUM 1 MG: 40 TABLET, DELAYED RELEASE ORAL at 07:53

## 2018-12-02 RX ADMIN — SENNOSIDES 1 TAB: 8.6 TABLET, FILM COATED ORAL at 20:13

## 2018-12-02 RX ADMIN — FLUTICASONE PROPIONATE AND SALMETEROL XINAFOATE 1 PUFF: 230; 21 AEROSOL, METERED RESPIRATORY (INHALATION) at 08:28

## 2018-12-02 RX ADMIN — ACETAMINOPHEN 1 MG: 500 TABLET ORAL at 08:08

## 2018-12-02 RX ADMIN — ACETAMINOPHEN 1 MG: 500 TABLET ORAL at 20:13

## 2018-12-02 RX ADMIN — BISACODYL 1 MG: 10 SUPPOSITORY RECTAL at 09:51

## 2018-12-02 RX ADMIN — FLUTICASONE PROPIONATE 1 SPRAY: 50 SPRAY, METERED NASAL at 08:02

## 2018-12-02 RX ADMIN — LEVOTHYROXINE SODIUM 1 MCG: 112 TABLET ORAL at 06:01

## 2018-12-02 RX ADMIN — ASPIRIN 1 MG: 81 TABLET ORAL at 07:52

## 2018-12-02 RX ADMIN — FLUTICASONE PROPIONATE AND SALMETEROL XINAFOATE 1 PUFF: 230; 21 AEROSOL, METERED RESPIRATORY (INHALATION) at 21:03

## 2018-12-02 RX ADMIN — BISOPROLOL FUMARATE 1 MG: 5 TABLET ORAL at 07:54

## 2018-12-02 RX ADMIN — DOCUSATE SODIUM 1 MG: 100 CAPSULE, LIQUID FILLED ORAL at 20:13

## 2018-12-02 RX ADMIN — ALBUTEROL SULFATE 1 MG: 2.5 SOLUTION RESPIRATORY (INHALATION) at 00:00

## 2018-12-02 RX ADMIN — RANOLAZINE 1 MG: 500 TABLET, FILM COATED, EXTENDED RELEASE ORAL at 07:55

## 2018-12-02 RX ADMIN — INSULIN LISPRO 2 UNITS: 100 INJECTION, SOLUTION INTRAVENOUS; SUBCUTANEOUS at 12:39

## 2018-12-02 RX ADMIN — INSULIN LISPRO 2 UNITS: 100 INJECTION, SOLUTION INTRAVENOUS; SUBCUTANEOUS at 17:02

## 2018-12-02 RX ADMIN — INSULIN LISPRO 2 UNITS: 100 INJECTION, SOLUTION INTRAVENOUS; SUBCUTANEOUS at 07:52

## 2018-12-02 RX ADMIN — TIOTROPIUM BROMIDE 1 INHALATION: 18 CAPSULE ORAL; RESPIRATORY (INHALATION) at 08:28

## 2018-12-02 RX ADMIN — ALBUTEROL SULFATE 1 MG: 2.5 SOLUTION RESPIRATORY (INHALATION) at 08:00

## 2018-12-02 RX ADMIN — RANOLAZINE 1 MG: 500 TABLET, FILM COATED, EXTENDED RELEASE ORAL at 20:14

## 2018-12-02 RX ADMIN — MAGNESIUM HYDROXIDE 1 ML: 400 SUSPENSION ORAL at 08:02

## 2018-12-02 RX ADMIN — DOCUSATE SODIUM 1 MG: 100 CAPSULE, LIQUID FILLED ORAL at 07:53

## 2018-12-02 RX ADMIN — ALBUTEROL SULFATE 1 MG: 2.5 SOLUTION RESPIRATORY (INHALATION) at 16:28

## 2018-12-02 RX ADMIN — GABAPENTIN 1 MG: 300 CAPSULE ORAL at 07:53

## 2018-12-03 LAB
GLUCOSE BLDC GLUCOMTR-MCNC: 106 MG/DL (ref 83–110)
GLUCOSE BLDC GLUCOMTR-MCNC: 111 MG/DL (ref 83–110)
GLUCOSE BLDC GLUCOMTR-MCNC: 113 MG/DL (ref 83–110)
GLUCOSE BLDC GLUCOMTR-MCNC: 118 MG/DL (ref 83–110)

## 2018-12-03 RX ADMIN — DOCUSATE SODIUM 1 MG: 100 CAPSULE, LIQUID FILLED ORAL at 08:23

## 2018-12-03 RX ADMIN — DOCUSATE SODIUM 1 MG: 100 CAPSULE, LIQUID FILLED ORAL at 21:01

## 2018-12-03 RX ADMIN — ALBUTEROL SULFATE 1 MG: 2.5 SOLUTION RESPIRATORY (INHALATION) at 23:24

## 2018-12-03 RX ADMIN — INSULIN LISPRO 2 UNITS: 100 INJECTION, SOLUTION INTRAVENOUS; SUBCUTANEOUS at 08:04

## 2018-12-03 RX ADMIN — ALBUTEROL SULFATE 1 MG: 2.5 SOLUTION RESPIRATORY (INHALATION) at 15:37

## 2018-12-03 RX ADMIN — GABAPENTIN 1 MG: 300 CAPSULE ORAL at 08:23

## 2018-12-03 RX ADMIN — ACETAMINOPHEN 1 MG: 500 TABLET ORAL at 23:53

## 2018-12-03 RX ADMIN — Medication 1 UNITS: at 08:23

## 2018-12-03 RX ADMIN — FLUTICASONE PROPIONATE AND SALMETEROL XINAFOATE 1 PUFF: 230; 21 AEROSOL, METERED RESPIRATORY (INHALATION) at 08:09

## 2018-12-03 RX ADMIN — FLUTICASONE PROPIONATE AND SALMETEROL XINAFOATE 1 PUFF: 230; 21 AEROSOL, METERED RESPIRATORY (INHALATION) at 20:57

## 2018-12-03 RX ADMIN — SENNOSIDES 1 TAB: 8.6 TABLET, FILM COATED ORAL at 21:01

## 2018-12-03 RX ADMIN — TIOTROPIUM BROMIDE 1 INHALATION: 18 CAPSULE ORAL; RESPIRATORY (INHALATION) at 08:09

## 2018-12-03 RX ADMIN — LEVOTHYROXINE SODIUM 1 MCG: 112 TABLET ORAL at 05:55

## 2018-12-03 RX ADMIN — ALBUTEROL SULFATE 1 MG: 2.5 SOLUTION RESPIRATORY (INHALATION) at 08:00

## 2018-12-03 RX ADMIN — ASPIRIN 1 MG: 81 TABLET ORAL at 08:23

## 2018-12-03 RX ADMIN — INSULIN LISPRO 2 UNITS: 100 INJECTION, SOLUTION INTRAVENOUS; SUBCUTANEOUS at 14:21

## 2018-12-03 RX ADMIN — CETIRIZINE HYDROCHLORIDE 1 MG: 10 TABLET, FILM COATED ORAL at 08:23

## 2018-12-03 RX ADMIN — ALBUTEROL SULFATE 1 MG: 2.5 SOLUTION RESPIRATORY (INHALATION) at 00:00

## 2018-12-03 RX ADMIN — PANTOPRAZOLE SODIUM 1 MG: 40 TABLET, DELAYED RELEASE ORAL at 08:23

## 2018-12-03 RX ADMIN — RANOLAZINE 1 MG: 500 TABLET, FILM COATED, EXTENDED RELEASE ORAL at 08:19

## 2018-12-03 RX ADMIN — RIVAROXABAN 1 MG: 20 TABLET, FILM COATED ORAL at 17:22

## 2018-12-03 RX ADMIN — INSULIN LISPRO 2 UNITS: 100 INJECTION, SOLUTION INTRAVENOUS; SUBCUTANEOUS at 17:23

## 2018-12-03 RX ADMIN — BISOPROLOL FUMARATE 1 MG: 5 TABLET ORAL at 08:22

## 2018-12-03 RX ADMIN — FLUTICASONE PROPIONATE 1 SPRAY: 50 SPRAY, METERED NASAL at 08:24

## 2018-12-03 RX ADMIN — RANOLAZINE 1 MG: 500 TABLET, FILM COATED, EXTENDED RELEASE ORAL at 21:01

## 2018-12-04 LAB
ANION GAP: 8 MEQ/L (ref 8–16)
BLOOD UREA NITROGEN: 15 MG/DL (ref 7–18)
CALCIUM LEVEL: 9.2 MG/DL (ref 8.8–10.2)
CARBON DIOXIDE LEVEL: 28 MEQ/L (ref 21–32)
CHLORIDE LEVEL: 102 MEQ/L (ref 98–107)
CREATININE FOR GFR: 0.64 MG/DL (ref 0.55–1.3)
GFR SERPL CREATININE-BSD FRML MDRD: > 60 ML/MIN/{1.73_M2} (ref 39–?)
GLUCOSE BLDC GLUCOMTR-MCNC: 109 MG/DL (ref 83–110)
GLUCOSE BLDC GLUCOMTR-MCNC: 114 MG/DL (ref 83–110)
GLUCOSE BLDC GLUCOMTR-MCNC: 122 MG/DL (ref 83–110)
GLUCOSE BLDC GLUCOMTR-MCNC: 149 MG/DL (ref 83–110)
GLUCOSE, FASTING: 115 MG/DL (ref 70–100)
HEMATOCRIT: 31.6 % (ref 36–47)
HEMOGLOBIN: 10.2 G/DL (ref 12–15.5)
LEUKOCYTE ESTERASE UR AUTO RFX: (no result)
MEAN CORPUSCULAR HEMOGLOBIN: 30.5 PG (ref 27–33)
MEAN CORPUSCULAR HGB CONC: 32.3 G/DL (ref 32–36.5)
MEAN CORPUSCULAR VOLUME: 94.6 FL (ref 80–96)
NRBC BLD AUTO-RTO: 0.3 % (ref 0–0)
PLATELET COUNT, AUTOMATED: 344 10^3/UL (ref 150–450)
POTASSIUM SERUM: 4.3 MEQ/L (ref 3.5–5.1)
RED BLOOD COUNT: 3.34 10^6/UL (ref 4–5.4)
RED CELL DISTRIBUTION WIDTH: 16 % (ref 11.5–14.5)
SODIUM LEVEL: 138 MEQ/L (ref 136–145)
SPECIFIC GRAVITY UR AUTO RFX: 1.01 (ref 1–1.03)
SQUAM EPITHELIAL CELL UR AURFX: 3 /HPF (ref 0–6)
WHITE BLOOD COUNT: 7.3 10^3/UL (ref 4–10)

## 2018-12-04 RX ADMIN — SENNOSIDES 1 TAB: 8.6 TABLET, FILM COATED ORAL at 20:08

## 2018-12-04 RX ADMIN — DOCUSATE SODIUM 1 MG: 100 CAPSULE, LIQUID FILLED ORAL at 09:37

## 2018-12-04 RX ADMIN — ACETAMINOPHEN 1 MG: 500 TABLET ORAL at 11:22

## 2018-12-04 RX ADMIN — LEVOTHYROXINE SODIUM 1 MCG: 112 TABLET ORAL at 05:38

## 2018-12-04 RX ADMIN — TIOTROPIUM BROMIDE 1 INHALATION: 18 CAPSULE ORAL; RESPIRATORY (INHALATION) at 09:04

## 2018-12-04 RX ADMIN — INSULIN LISPRO 1 UNITS: 100 INJECTION, SOLUTION INTRAVENOUS; SUBCUTANEOUS at 17:30

## 2018-12-04 RX ADMIN — ALBUTEROL SULFATE 1 MG: 2.5 SOLUTION RESPIRATORY (INHALATION) at 20:36

## 2018-12-04 RX ADMIN — ALBUTEROL SULFATE 1 MG: 2.5 SOLUTION RESPIRATORY (INHALATION) at 17:02

## 2018-12-04 RX ADMIN — FLUTICASONE PROPIONATE AND SALMETEROL XINAFOATE 1 PUFF: 230; 21 AEROSOL, METERED RESPIRATORY (INHALATION) at 09:04

## 2018-12-04 RX ADMIN — RANOLAZINE 1 MG: 500 TABLET, FILM COATED, EXTENDED RELEASE ORAL at 20:08

## 2018-12-04 RX ADMIN — BISOPROLOL FUMARATE 1 MG: 10 TABLET ORAL at 09:44

## 2018-12-04 RX ADMIN — CETIRIZINE HYDROCHLORIDE 1 MG: 10 TABLET, FILM COATED ORAL at 09:38

## 2018-12-04 RX ADMIN — ALBUTEROL SULFATE 1 MG: 2.5 SOLUTION RESPIRATORY (INHALATION) at 09:04

## 2018-12-04 RX ADMIN — INSULIN LISPRO 1 UNITS: 100 INJECTION, SOLUTION INTRAVENOUS; SUBCUTANEOUS at 08:00

## 2018-12-04 RX ADMIN — PANTOPRAZOLE SODIUM 1 MG: 40 TABLET, DELAYED RELEASE ORAL at 09:37

## 2018-12-04 RX ADMIN — DOCUSATE SODIUM 1 MG: 100 CAPSULE, LIQUID FILLED ORAL at 20:08

## 2018-12-04 RX ADMIN — FLUTICASONE PROPIONATE 1 SPRAY: 50 SPRAY, METERED NASAL at 09:38

## 2018-12-04 RX ADMIN — LIDOCAINE 1 PATCH: 50 PATCH CUTANEOUS at 23:22

## 2018-12-04 RX ADMIN — RANOLAZINE 1 MG: 500 TABLET, FILM COATED, EXTENDED RELEASE ORAL at 09:39

## 2018-12-04 RX ADMIN — ACETAMINOPHEN 1 MG: 500 TABLET ORAL at 23:21

## 2018-12-04 RX ADMIN — RIVAROXABAN 1 MG: 20 TABLET, FILM COATED ORAL at 17:19

## 2018-12-04 RX ADMIN — GABAPENTIN 1 MG: 300 CAPSULE ORAL at 09:37

## 2018-12-04 RX ADMIN — Medication 1 UNITS: at 09:37

## 2018-12-04 RX ADMIN — INSULIN LISPRO 1 UNITS: 100 INJECTION, SOLUTION INTRAVENOUS; SUBCUTANEOUS at 12:00

## 2018-12-04 RX ADMIN — ASPIRIN 1 MG: 81 TABLET ORAL at 09:37

## 2018-12-05 LAB
ANION GAP: 7 MEQ/L (ref 8–16)
BLOOD UREA NITROGEN: 15 MG/DL (ref 7–18)
CALCIUM LEVEL: 8.8 MG/DL (ref 8.8–10.2)
CARBON DIOXIDE LEVEL: 30 MEQ/L (ref 21–32)
CHLORIDE LEVEL: 102 MEQ/L (ref 98–107)
CHOLEST SERPL-MCNC: 185 MG/DL (ref ?–200)
CHOLESTEROL RISK RATIO: 4.87 (ref ?–5)
CK MB CFR.DF SERPL CALC: 2.21
CK SERPL-CCNC: 140 U/L (ref 26–192)
CK-MB VALUE MASS: 3 NG/ML (ref ?–3.6)
CK-MB VALUE MASS: 3 NG/ML (ref ?–3.6)
CREATININE FOR GFR: 0.78 MG/DL (ref 0.55–1.3)
EST. AVERAGE GLUCOSE BLD GHB EST-MCNC: 131 MG/DL (ref 60–110)
GFR SERPL CREATININE-BSD FRML MDRD: > 60 ML/MIN/{1.73_M2} (ref 39–?)
GLUCOSE BLDC GLUCOMTR-MCNC: 100 MG/DL (ref 83–110)
GLUCOSE BLDC GLUCOMTR-MCNC: 115 MG/DL (ref 83–110)
GLUCOSE BLDC GLUCOMTR-MCNC: 134 MG/DL (ref 83–110)
GLUCOSE BLDC GLUCOMTR-MCNC: 135 MG/DL (ref 83–110)
GLUCOSE, FASTING: 133 MG/DL (ref 70–100)
HDLC SERPL-MCNC: 38 MG/DL (ref 40–?)
LDL CHOLESTEROL: 103 MG/DL (ref ?–100)
NONHDLC SERPL-MCNC: 147 MG/DL
POTASSIUM SERUM: 4.1 MEQ/L (ref 3.5–5.1)
SODIUM LEVEL: 139 MEQ/L (ref 136–145)
TRIGLYCERIDES LEVEL: 218 MG/DL (ref ?–150)
TROPONIN I: < 0.02 NG/ML (ref ?–0.1)
TROPONIN I: < 0.02 NG/ML (ref ?–0.1)

## 2018-12-05 RX ADMIN — AMOXICILLIN AND CLAVULANATE POTASSIUM 1 MG: 500; 125 TABLET, FILM COATED ORAL at 21:07

## 2018-12-05 RX ADMIN — RANOLAZINE 1 MG: 500 TABLET, FILM COATED, EXTENDED RELEASE ORAL at 21:07

## 2018-12-05 RX ADMIN — INSULIN LISPRO 2 UNITS: 100 INJECTION, SOLUTION INTRAVENOUS; SUBCUTANEOUS at 17:28

## 2018-12-05 RX ADMIN — FLUTICASONE PROPIONATE AND SALMETEROL XINAFOATE 1 PUFF: 230; 21 AEROSOL, METERED RESPIRATORY (INHALATION) at 21:32

## 2018-12-05 RX ADMIN — CETIRIZINE HYDROCHLORIDE 1 MG: 10 TABLET, FILM COATED ORAL at 08:38

## 2018-12-05 RX ADMIN — ASPIRIN 1 MG: 81 TABLET ORAL at 08:37

## 2018-12-05 RX ADMIN — TIOTROPIUM BROMIDE 1 INHALATION: 18 CAPSULE ORAL; RESPIRATORY (INHALATION) at 08:13

## 2018-12-05 RX ADMIN — ALBUTEROL SULFATE 1 MG: 2.5 SOLUTION RESPIRATORY (INHALATION) at 23:16

## 2018-12-05 RX ADMIN — ACETAMINOPHEN 1 MG: 500 TABLET ORAL at 21:08

## 2018-12-05 RX ADMIN — RANOLAZINE 1 MG: 500 TABLET, FILM COATED, EXTENDED RELEASE ORAL at 08:37

## 2018-12-05 RX ADMIN — LIDOCAINE 1 PATCH: 50 PATCH CUTANEOUS at 21:08

## 2018-12-05 RX ADMIN — LEVOTHYROXINE SODIUM 1 MCG: 112 TABLET ORAL at 06:12

## 2018-12-05 RX ADMIN — DOCUSATE SODIUM 1 MG: 100 CAPSULE, LIQUID FILLED ORAL at 21:07

## 2018-12-05 RX ADMIN — INSULIN LISPRO 1 UNITS: 100 INJECTION, SOLUTION INTRAVENOUS; SUBCUTANEOUS at 07:30

## 2018-12-05 RX ADMIN — Medication 1: at 08:38

## 2018-12-05 RX ADMIN — RIVAROXABAN 1 MG: 20 TABLET, FILM COATED ORAL at 17:27

## 2018-12-05 RX ADMIN — ALBUTEROL SULFATE 1 MG: 2.5 SOLUTION RESPIRATORY (INHALATION) at 08:00

## 2018-12-05 RX ADMIN — PANTOPRAZOLE SODIUM 1 MG: 40 TABLET, DELAYED RELEASE ORAL at 08:38

## 2018-12-05 RX ADMIN — Medication 1 UNITS: at 08:37

## 2018-12-05 RX ADMIN — BISOPROLOL FUMARATE 1 MG: 10 TABLET ORAL at 08:38

## 2018-12-05 RX ADMIN — DOCUSATE SODIUM 1 MG: 100 CAPSULE, LIQUID FILLED ORAL at 08:38

## 2018-12-05 RX ADMIN — ALBUTEROL SULFATE 1 MG: 2.5 SOLUTION RESPIRATORY (INHALATION) at 16:00

## 2018-12-05 RX ADMIN — INSULIN LISPRO 1 UNITS: 100 INJECTION, SOLUTION INTRAVENOUS; SUBCUTANEOUS at 11:31

## 2018-12-05 RX ADMIN — SENNOSIDES 1 TAB: 8.6 TABLET, FILM COATED ORAL at 21:07

## 2018-12-05 RX ADMIN — FLUTICASONE PROPIONATE AND SALMETEROL XINAFOATE 1 PUFF: 230; 21 AEROSOL, METERED RESPIRATORY (INHALATION) at 00:00

## 2018-12-05 RX ADMIN — FLUTICASONE PROPIONATE 1 SPRAY: 50 SPRAY, METERED NASAL at 08:38

## 2018-12-05 RX ADMIN — ACETAMINOPHEN 1 MG: 500 TABLET ORAL at 06:56

## 2018-12-05 RX ADMIN — FLUTICASONE PROPIONATE AND SALMETEROL XINAFOATE 1 PUFF: 230; 21 AEROSOL, METERED RESPIRATORY (INHALATION) at 08:13

## 2018-12-06 LAB
ALBUMIN/GLOBULIN RATIO: 0.74 (ref 1–1.93)
ALBUMIN: 3.1 GM/DL (ref 3.2–5.2)
ALKALINE PHOSPHATASE: 64 U/L (ref 45–117)
ALT SERPL W P-5'-P-CCNC: 23 U/L (ref 12–78)
ANION GAP: 8 MEQ/L (ref 8–16)
AST SERPL-CCNC: 34 U/L (ref 7–37)
BASO #: 0.1 10^3/UL (ref 0–0.2)
BASO %: 0.8 % (ref 0–1)
BILIRUBIN,TOTAL: 0.6 MG/DL (ref 0.2–1)
BLOOD UREA NITROGEN: 14 MG/DL (ref 7–18)
CALCIUM LEVEL: 9.2 MG/DL (ref 8.8–10.2)
CARBON DIOXIDE LEVEL: 28 MEQ/L (ref 21–32)
CHLORIDE LEVEL: 101 MEQ/L (ref 98–107)
CK MB CFR.DF SERPL CALC: 2.03
CK SERPL-CCNC: 128 U/L (ref 26–192)
CK-MB VALUE MASS: 3 NG/ML (ref ?–3.6)
CREATININE FOR GFR: 0.77 MG/DL (ref 0.55–1.3)
EOS #: 0.2 10^3/UL (ref 0–0.5)
EOSINOPHIL NFR BLD AUTO: 3.6 % (ref 0–3)
GFR SERPL CREATININE-BSD FRML MDRD: > 60 ML/MIN/{1.73_M2} (ref 39–?)
GLUCOSE BLDC GLUCOMTR-MCNC: 113 MG/DL (ref 83–110)
GLUCOSE BLDC GLUCOMTR-MCNC: 119 MG/DL (ref 83–110)
GLUCOSE BLDC GLUCOMTR-MCNC: 126 MG/DL (ref 83–110)
GLUCOSE, FASTING: 130 MG/DL (ref 70–100)
HEMATOCRIT: 32.4 % (ref 36–47)
HEMOGLOBIN: 10.5 G/DL (ref 12–15.5)
IMMATURE GRANULOCYTE %: 1.1 % (ref 0–3)
LYMPH #: 1.3 10^3/UL (ref 1.5–4.5)
LYMPH %: 19.6 % (ref 24–44)
MEAN CORPUSCULAR HEMOGLOBIN: 30.6 PG (ref 27–33)
MEAN CORPUSCULAR HGB CONC: 32.4 G/DL (ref 32–36.5)
MEAN CORPUSCULAR VOLUME: 94.5 FL (ref 80–96)
MONO #: 0.7 10^3/UL (ref 0–0.8)
MONO %: 11.6 % (ref 0–5)
NEUTROPHILS #: 4.1 10^3/UL (ref 1.8–7.7)
NEUTROPHILS %: 63.3 % (ref 36–66)
NRBC BLD AUTO-RTO: 0 % (ref 0–0)
PLATELET COUNT, AUTOMATED: 378 10^3/UL (ref 150–450)
POTASSIUM SERUM: 4.1 MEQ/L (ref 3.5–5.1)
RED BLOOD COUNT: 3.43 10^6/UL (ref 4–5.4)
RED CELL DISTRIBUTION WIDTH: 16.3 % (ref 11.5–14.5)
SODIUM LEVEL: 137 MEQ/L (ref 136–145)
TOTAL PROTEIN: 7.3 GM/DL (ref 6.4–8.2)
TROPONIN I: < 0.02 NG/ML (ref ?–0.1)
WHITE BLOOD COUNT: 6.4 10^3/UL (ref 4–10)

## 2018-12-06 RX ADMIN — ASPIRIN 1 MG: 81 TABLET ORAL at 08:07

## 2018-12-06 RX ADMIN — TIOTROPIUM BROMIDE 1 INHALATION: 18 CAPSULE ORAL; RESPIRATORY (INHALATION) at 07:49

## 2018-12-06 RX ADMIN — AMOXICILLIN AND CLAVULANATE POTASSIUM 1 MG: 500; 125 TABLET, FILM COATED ORAL at 20:33

## 2018-12-06 RX ADMIN — INSULIN LISPRO 2 UNITS: 100 INJECTION, SOLUTION INTRAVENOUS; SUBCUTANEOUS at 12:29

## 2018-12-06 RX ADMIN — DOCUSATE SODIUM 1 MG: 100 CAPSULE, LIQUID FILLED ORAL at 20:34

## 2018-12-06 RX ADMIN — PANTOPRAZOLE SODIUM 1 MG: 40 TABLET, DELAYED RELEASE ORAL at 08:11

## 2018-12-06 RX ADMIN — ACETAMINOPHEN 1 MG: 500 TABLET ORAL at 08:07

## 2018-12-06 RX ADMIN — FLUTICASONE PROPIONATE AND SALMETEROL XINAFOATE 1 PUFF: 230; 21 AEROSOL, METERED RESPIRATORY (INHALATION) at 07:49

## 2018-12-06 RX ADMIN — AMOXICILLIN AND CLAVULANATE POTASSIUM 1 MG: 500; 125 TABLET, FILM COATED ORAL at 08:07

## 2018-12-06 RX ADMIN — ACETAMINOPHEN 1 MG: 500 TABLET ORAL at 16:22

## 2018-12-06 RX ADMIN — BISOPROLOL FUMARATE 1 MG: 10 TABLET ORAL at 08:10

## 2018-12-06 RX ADMIN — RANOLAZINE 1 MG: 500 TABLET, FILM COATED, EXTENDED RELEASE ORAL at 20:33

## 2018-12-06 RX ADMIN — LEVOTHYROXINE SODIUM 1 MCG: 112 TABLET ORAL at 05:38

## 2018-12-06 RX ADMIN — SENNOSIDES 1 TAB: 8.6 TABLET, FILM COATED ORAL at 20:34

## 2018-12-06 RX ADMIN — Medication 1 UNITS: at 08:11

## 2018-12-06 RX ADMIN — ALBUTEROL SULFATE 1 MG: 2.5 SOLUTION RESPIRATORY (INHALATION) at 07:49

## 2018-12-06 RX ADMIN — FLUTICASONE PROPIONATE AND SALMETEROL XINAFOATE 1 PUFF: 230; 21 AEROSOL, METERED RESPIRATORY (INHALATION) at 20:49

## 2018-12-06 RX ADMIN — Medication 1: at 08:12

## 2018-12-06 RX ADMIN — RANOLAZINE 1 MG: 500 TABLET, FILM COATED, EXTENDED RELEASE ORAL at 08:10

## 2018-12-06 RX ADMIN — DOCUSATE SODIUM 1 MG: 100 CAPSULE, LIQUID FILLED ORAL at 08:11

## 2018-12-06 RX ADMIN — LIDOCAINE 1 PATCH: 50 PATCH CUTANEOUS at 20:34

## 2018-12-06 RX ADMIN — ALBUTEROL SULFATE 1 MG: 2.5 SOLUTION RESPIRATORY (INHALATION) at 15:25

## 2018-12-06 RX ADMIN — RIVAROXABAN 1 MG: 20 TABLET, FILM COATED ORAL at 17:27

## 2018-12-06 RX ADMIN — FLUTICASONE PROPIONATE 1 SPRAY: 50 SPRAY, METERED NASAL at 08:11

## 2018-12-06 RX ADMIN — INSULIN LISPRO 2 UNITS: 100 INJECTION, SOLUTION INTRAVENOUS; SUBCUTANEOUS at 08:06

## 2018-12-06 RX ADMIN — INSULIN LISPRO 2 UNITS: 100 INJECTION, SOLUTION INTRAVENOUS; SUBCUTANEOUS at 17:27

## 2018-12-06 RX ADMIN — CETIRIZINE HYDROCHLORIDE 1 MG: 10 TABLET, FILM COATED ORAL at 08:11

## 2018-12-07 LAB
ANION GAP: 5 MEQ/L (ref 8–16)
BLOOD UREA NITROGEN: 14 MG/DL (ref 7–18)
CALCIUM LEVEL: 8.9 MG/DL (ref 8.8–10.2)
CARBON DIOXIDE LEVEL: 31 MEQ/L (ref 21–32)
CHLORIDE LEVEL: 104 MEQ/L (ref 98–107)
CREATININE FOR GFR: 0.75 MG/DL (ref 0.55–1.3)
GFR SERPL CREATININE-BSD FRML MDRD: > 60 ML/MIN/{1.73_M2} (ref 39–?)
GLUCOSE BLDC GLUCOMTR-MCNC: 111 MG/DL (ref 83–110)
GLUCOSE BLDC GLUCOMTR-MCNC: 117 MG/DL (ref 83–110)
GLUCOSE BLDC GLUCOMTR-MCNC: 133 MG/DL (ref 83–110)
GLUCOSE BLDC GLUCOMTR-MCNC: 140 MG/DL (ref 83–110)
GLUCOSE, FASTING: 129 MG/DL (ref 70–100)
POTASSIUM SERUM: 4.5 MEQ/L (ref 3.5–5.1)
SODIUM LEVEL: 140 MEQ/L (ref 136–145)

## 2018-12-07 RX ADMIN — BISOPROLOL FUMARATE 1 MG: 10 TABLET ORAL at 09:30

## 2018-12-07 RX ADMIN — Medication 1: at 09:00

## 2018-12-07 RX ADMIN — ALBUTEROL SULFATE 1 MG: 2.5 SOLUTION RESPIRATORY (INHALATION) at 07:49

## 2018-12-07 RX ADMIN — DOCUSATE SODIUM 1 MG: 100 CAPSULE, LIQUID FILLED ORAL at 20:03

## 2018-12-07 RX ADMIN — LEVOTHYROXINE SODIUM 1 MCG: 112 TABLET ORAL at 05:29

## 2018-12-07 RX ADMIN — ASPIRIN 1 MG: 81 TABLET ORAL at 09:30

## 2018-12-07 RX ADMIN — TIOTROPIUM BROMIDE 1 INHALATION: 18 CAPSULE ORAL; RESPIRATORY (INHALATION) at 11:29

## 2018-12-07 RX ADMIN — FLUTICASONE PROPIONATE AND SALMETEROL XINAFOATE 1 PUFF: 230; 21 AEROSOL, METERED RESPIRATORY (INHALATION) at 20:16

## 2018-12-07 RX ADMIN — ACETAMINOPHEN 1 MG: 500 TABLET ORAL at 05:29

## 2018-12-07 RX ADMIN — RIVAROXABAN 1 MG: 20 TABLET, FILM COATED ORAL at 17:21

## 2018-12-07 RX ADMIN — INSULIN LISPRO 2 UNITS: 100 INJECTION, SOLUTION INTRAVENOUS; SUBCUTANEOUS at 09:29

## 2018-12-07 RX ADMIN — FLUTICASONE PROPIONATE AND SALMETEROL XINAFOATE 1 PUFF: 230; 21 AEROSOL, METERED RESPIRATORY (INHALATION) at 11:29

## 2018-12-07 RX ADMIN — ALBUTEROL SULFATE 1 MG: 2.5 SOLUTION RESPIRATORY (INHALATION) at 14:50

## 2018-12-07 RX ADMIN — Medication 1 UNITS: at 09:30

## 2018-12-07 RX ADMIN — ALBUTEROL SULFATE 1 MG: 2.5 SOLUTION RESPIRATORY (INHALATION) at 00:00

## 2018-12-07 RX ADMIN — SENNOSIDES 1 TAB: 8.6 TABLET, FILM COATED ORAL at 20:03

## 2018-12-07 RX ADMIN — RANOLAZINE 1 MG: 500 TABLET, FILM COATED, EXTENDED RELEASE ORAL at 20:03

## 2018-12-07 RX ADMIN — AMOXICILLIN AND CLAVULANATE POTASSIUM 1 MG: 500; 125 TABLET, FILM COATED ORAL at 09:30

## 2018-12-07 RX ADMIN — CETIRIZINE HYDROCHLORIDE 1 MG: 10 TABLET, FILM COATED ORAL at 09:30

## 2018-12-07 RX ADMIN — INSULIN LISPRO 2 UNITS: 100 INJECTION, SOLUTION INTRAVENOUS; SUBCUTANEOUS at 17:21

## 2018-12-07 RX ADMIN — PANTOPRAZOLE SODIUM 1 MG: 40 TABLET, DELAYED RELEASE ORAL at 09:30

## 2018-12-07 RX ADMIN — DOCUSATE SODIUM 1 MG: 100 CAPSULE, LIQUID FILLED ORAL at 09:31

## 2018-12-07 RX ADMIN — FLUTICASONE PROPIONATE 1 SPRAY: 50 SPRAY, METERED NASAL at 09:31

## 2018-12-07 RX ADMIN — LIDOCAINE 1 PATCH: 50 PATCH CUTANEOUS at 20:03

## 2018-12-07 RX ADMIN — RANOLAZINE 1 MG: 500 TABLET, FILM COATED, EXTENDED RELEASE ORAL at 09:30

## 2018-12-07 RX ADMIN — AMOXICILLIN AND CLAVULANATE POTASSIUM 1 MG: 500; 125 TABLET, FILM COATED ORAL at 20:04

## 2018-12-07 RX ADMIN — ACETAMINOPHEN 1 MG: 500 TABLET ORAL at 20:03

## 2018-12-07 RX ADMIN — INSULIN LISPRO 2 UNITS: 100 INJECTION, SOLUTION INTRAVENOUS; SUBCUTANEOUS at 12:57

## 2018-12-08 LAB
GLUCOSE BLDC GLUCOMTR-MCNC: 126 MG/DL (ref 83–110)
GLUCOSE BLDC GLUCOMTR-MCNC: 133 MG/DL (ref 83–110)
GLUCOSE BLDC GLUCOMTR-MCNC: 143 MG/DL (ref 83–110)

## 2018-12-08 RX ADMIN — Medication 1: at 09:00

## 2018-12-08 RX ADMIN — ALBUTEROL SULFATE 1 MG: 2.5 SOLUTION RESPIRATORY (INHALATION) at 00:00

## 2018-12-08 RX ADMIN — RANOLAZINE 1 MG: 500 TABLET, FILM COATED, EXTENDED RELEASE ORAL at 08:48

## 2018-12-08 RX ADMIN — DOCUSATE SODIUM 1 MG: 100 CAPSULE, LIQUID FILLED ORAL at 21:05

## 2018-12-08 RX ADMIN — AMOXICILLIN AND CLAVULANATE POTASSIUM 1 MG: 500; 125 TABLET, FILM COATED ORAL at 21:05

## 2018-12-08 RX ADMIN — BISOPROLOL FUMARATE 1 MG: 10 TABLET ORAL at 08:44

## 2018-12-08 RX ADMIN — Medication 1 UNITS: at 08:44

## 2018-12-08 RX ADMIN — PANTOPRAZOLE SODIUM 1 MG: 40 TABLET, DELAYED RELEASE ORAL at 08:44

## 2018-12-08 RX ADMIN — ALBUTEROL SULFATE 1 MG: 2.5 SOLUTION RESPIRATORY (INHALATION) at 08:00

## 2018-12-08 RX ADMIN — INSULIN LISPRO 1 UNITS: 100 INJECTION, SOLUTION INTRAVENOUS; SUBCUTANEOUS at 11:59

## 2018-12-08 RX ADMIN — INSULIN LISPRO 1 UNITS: 100 INJECTION, SOLUTION INTRAVENOUS; SUBCUTANEOUS at 07:30

## 2018-12-08 RX ADMIN — ASPIRIN 1 MG: 81 TABLET ORAL at 08:44

## 2018-12-08 RX ADMIN — INSULIN LISPRO 1 UNITS: 100 INJECTION, SOLUTION INTRAVENOUS; SUBCUTANEOUS at 16:55

## 2018-12-08 RX ADMIN — TIOTROPIUM BROMIDE 1 INHALATION: 18 CAPSULE ORAL; RESPIRATORY (INHALATION) at 09:03

## 2018-12-08 RX ADMIN — RANOLAZINE 1 MG: 500 TABLET, FILM COATED, EXTENDED RELEASE ORAL at 21:05

## 2018-12-08 RX ADMIN — FLUTICASONE PROPIONATE AND SALMETEROL XINAFOATE 1 PUFF: 230; 21 AEROSOL, METERED RESPIRATORY (INHALATION) at 20:12

## 2018-12-08 RX ADMIN — FLUTICASONE PROPIONATE AND SALMETEROL XINAFOATE 1 PUFF: 230; 21 AEROSOL, METERED RESPIRATORY (INHALATION) at 09:03

## 2018-12-08 RX ADMIN — Medication 1 EA: at 15:50

## 2018-12-08 RX ADMIN — RIVAROXABAN 1 MG: 20 TABLET, FILM COATED ORAL at 17:23

## 2018-12-08 RX ADMIN — LIDOCAINE 1 PATCH: 50 PATCH CUTANEOUS at 21:05

## 2018-12-08 RX ADMIN — AMOXICILLIN AND CLAVULANATE POTASSIUM 1 MG: 500; 125 TABLET, FILM COATED ORAL at 08:44

## 2018-12-08 RX ADMIN — SENNOSIDES 1 TAB: 8.6 TABLET, FILM COATED ORAL at 21:05

## 2018-12-08 RX ADMIN — FLUTICASONE PROPIONATE 1 SPRAY: 50 SPRAY, METERED NASAL at 08:45

## 2018-12-08 RX ADMIN — CETIRIZINE HYDROCHLORIDE 1 MG: 10 TABLET, FILM COATED ORAL at 08:44

## 2018-12-08 RX ADMIN — ACETAMINOPHEN 1 MG: 500 TABLET ORAL at 05:49

## 2018-12-08 RX ADMIN — ACETAMINOPHEN 1 MG: 500 TABLET ORAL at 21:05

## 2018-12-08 RX ADMIN — DOCUSATE SODIUM 1 MG: 100 CAPSULE, LIQUID FILLED ORAL at 08:45

## 2018-12-08 RX ADMIN — LEVOTHYROXINE SODIUM 1 MCG: 112 TABLET ORAL at 05:49

## 2018-12-09 LAB
GLUCOSE BLDC GLUCOMTR-MCNC: 110 MG/DL (ref 83–110)
GLUCOSE BLDC GLUCOMTR-MCNC: 129 MG/DL (ref 83–110)
GLUCOSE BLDC GLUCOMTR-MCNC: 131 MG/DL (ref 83–110)

## 2018-12-09 RX ADMIN — FLUTICASONE PROPIONATE AND SALMETEROL XINAFOATE 1 PUFF: 230; 21 AEROSOL, METERED RESPIRATORY (INHALATION) at 08:11

## 2018-12-09 RX ADMIN — ALBUTEROL SULFATE 1 MG: 2.5 SOLUTION RESPIRATORY (INHALATION) at 23:51

## 2018-12-09 RX ADMIN — MAGNESIUM HYDROXIDE 1 ML: 400 SUSPENSION ORAL at 14:00

## 2018-12-09 RX ADMIN — TIOTROPIUM BROMIDE 1 INHALATION: 18 CAPSULE ORAL; RESPIRATORY (INHALATION) at 08:11

## 2018-12-09 RX ADMIN — ALBUTEROL SULFATE 1 MG: 2.5 SOLUTION RESPIRATORY (INHALATION) at 00:00

## 2018-12-09 RX ADMIN — PANTOPRAZOLE SODIUM 1 MG: 40 TABLET, DELAYED RELEASE ORAL at 08:14

## 2018-12-09 RX ADMIN — FLUTICASONE PROPIONATE 1 SPRAY: 50 SPRAY, METERED NASAL at 08:14

## 2018-12-09 RX ADMIN — BISOPROLOL FUMARATE 1 MG: 10 TABLET ORAL at 08:14

## 2018-12-09 RX ADMIN — SENNOSIDES 1 TAB: 8.6 TABLET, FILM COATED ORAL at 22:02

## 2018-12-09 RX ADMIN — Medication 1 UNITS: at 08:14

## 2018-12-09 RX ADMIN — RIVAROXABAN 1 MG: 20 TABLET, FILM COATED ORAL at 17:03

## 2018-12-09 RX ADMIN — DOCUSATE SODIUM 1 MG: 100 CAPSULE, LIQUID FILLED ORAL at 22:03

## 2018-12-09 RX ADMIN — RANOLAZINE 1 MG: 500 TABLET, FILM COATED, EXTENDED RELEASE ORAL at 22:02

## 2018-12-09 RX ADMIN — ALBUTEROL SULFATE 1 MG: 2.5 SOLUTION RESPIRATORY (INHALATION) at 08:00

## 2018-12-09 RX ADMIN — Medication 1 EA: at 09:00

## 2018-12-09 RX ADMIN — INSULIN LISPRO 1 UNITS: 100 INJECTION, SOLUTION INTRAVENOUS; SUBCUTANEOUS at 11:47

## 2018-12-09 RX ADMIN — INSULIN LISPRO 1 UNITS: 100 INJECTION, SOLUTION INTRAVENOUS; SUBCUTANEOUS at 07:30

## 2018-12-09 RX ADMIN — AMOXICILLIN AND CLAVULANATE POTASSIUM 1 MG: 500; 125 TABLET, FILM COATED ORAL at 22:03

## 2018-12-09 RX ADMIN — RANOLAZINE 1 MG: 500 TABLET, FILM COATED, EXTENDED RELEASE ORAL at 08:13

## 2018-12-09 RX ADMIN — DOCUSATE SODIUM 1 MG: 100 CAPSULE, LIQUID FILLED ORAL at 08:14

## 2018-12-09 RX ADMIN — ACETAMINOPHEN 1 MG: 500 TABLET ORAL at 06:02

## 2018-12-09 RX ADMIN — AMOXICILLIN AND CLAVULANATE POTASSIUM 1 MG: 500; 125 TABLET, FILM COATED ORAL at 08:14

## 2018-12-09 RX ADMIN — INSULIN LISPRO 1 UNITS: 100 INJECTION, SOLUTION INTRAVENOUS; SUBCUTANEOUS at 16:49

## 2018-12-09 RX ADMIN — ASPIRIN 1 MG: 81 TABLET ORAL at 08:14

## 2018-12-09 RX ADMIN — LIDOCAINE 1 PATCH: 50 PATCH CUTANEOUS at 22:03

## 2018-12-09 RX ADMIN — CETIRIZINE HYDROCHLORIDE 1 MG: 10 TABLET, FILM COATED ORAL at 08:14

## 2018-12-09 RX ADMIN — Medication 1: at 08:15

## 2018-12-09 RX ADMIN — ACETAMINOPHEN 1 MG: 500 TABLET ORAL at 14:00

## 2018-12-09 RX ADMIN — ACETAMINOPHEN 1 MG: 500 TABLET ORAL at 22:02

## 2018-12-09 RX ADMIN — LEVOTHYROXINE SODIUM 1 MCG: 112 TABLET ORAL at 06:02

## 2018-12-09 RX ADMIN — FLUTICASONE PROPIONATE AND SALMETEROL XINAFOATE 1 PUFF: 230; 21 AEROSOL, METERED RESPIRATORY (INHALATION) at 19:55

## 2018-12-10 LAB
ANION GAP: 7 MEQ/L (ref 8–16)
BASO #: 0.1 10^3/UL (ref 0–0.2)
BASO %: 0.6 % (ref 0–1)
BLOOD UREA NITROGEN: 17 MG/DL (ref 7–18)
CALCIUM LEVEL: 9.4 MG/DL (ref 8.8–10.2)
CARBON DIOXIDE LEVEL: 28 MEQ/L (ref 21–32)
CHLORIDE LEVEL: 104 MEQ/L (ref 98–107)
CREATININE FOR GFR: 0.77 MG/DL (ref 0.55–1.3)
EOS #: 0.2 10^3/UL (ref 0–0.5)
EOSINOPHIL NFR BLD AUTO: 2.1 % (ref 0–3)
GFR SERPL CREATININE-BSD FRML MDRD: > 60 ML/MIN/{1.73_M2} (ref 39–?)
GLUCOSE BLDC GLUCOMTR-MCNC: 126 MG/DL (ref 83–110)
GLUCOSE BLDC GLUCOMTR-MCNC: 129 MG/DL (ref 83–110)
GLUCOSE BLDC GLUCOMTR-MCNC: 183 MG/DL (ref 83–110)
GLUCOSE BLDC GLUCOMTR-MCNC: 98 MG/DL (ref 83–110)
GLUCOSE, FASTING: 131 MG/DL (ref 70–100)
HEMATOCRIT: 32.4 % (ref 36–47)
HEMOGLOBIN: 10.2 G/DL (ref 12–15.5)
IMMATURE GRANULOCYTE %: 0.6 % (ref 0–3)
LYMPH #: 2.3 10^3/UL (ref 1.5–4.5)
LYMPH %: 28.2 % (ref 24–44)
MEAN CORPUSCULAR HEMOGLOBIN: 30 PG (ref 27–33)
MEAN CORPUSCULAR HGB CONC: 31.5 G/DL (ref 32–36.5)
MEAN CORPUSCULAR VOLUME: 95.3 FL (ref 80–96)
MONO #: 0.9 10^3/UL (ref 0–0.8)
MONO %: 11.6 % (ref 0–5)
NEUTROPHILS #: 4.6 10^3/UL (ref 1.8–7.7)
NEUTROPHILS %: 56.9 % (ref 36–66)
NRBC BLD AUTO-RTO: 0 % (ref 0–0)
PLATELET COUNT, AUTOMATED: 383 10^3/UL (ref 150–450)
POTASSIUM SERUM: 4.2 MEQ/L (ref 3.5–5.1)
RED BLOOD COUNT: 3.4 10^6/UL (ref 4–5.4)
RED CELL DISTRIBUTION WIDTH: 16.7 % (ref 11.5–14.5)
SODIUM LEVEL: 139 MEQ/L (ref 136–145)
WHITE BLOOD COUNT: 8.1 10^3/UL (ref 4–10)

## 2018-12-10 RX ADMIN — SENNOSIDES 1 TAB: 8.6 TABLET, FILM COATED ORAL at 20:39

## 2018-12-10 RX ADMIN — INSULIN LISPRO 2 UNITS: 100 INJECTION, SOLUTION INTRAVENOUS; SUBCUTANEOUS at 09:10

## 2018-12-10 RX ADMIN — ALBUTEROL SULFATE 1 MG: 2.5 SOLUTION RESPIRATORY (INHALATION) at 08:00

## 2018-12-10 RX ADMIN — ALBUTEROL SULFATE 1 MG: 2.5 SOLUTION RESPIRATORY (INHALATION) at 15:11

## 2018-12-10 RX ADMIN — LEVOTHYROXINE SODIUM 1 MCG: 112 TABLET ORAL at 06:14

## 2018-12-10 RX ADMIN — LIDOCAINE 1 PATCH: 50 PATCH CUTANEOUS at 20:40

## 2018-12-10 RX ADMIN — RANOLAZINE 1 MG: 500 TABLET, FILM COATED, EXTENDED RELEASE ORAL at 09:09

## 2018-12-10 RX ADMIN — CETIRIZINE HYDROCHLORIDE 1 MG: 10 TABLET, FILM COATED ORAL at 09:09

## 2018-12-10 RX ADMIN — BISOPROLOL FUMARATE 1 MG: 10 TABLET ORAL at 09:10

## 2018-12-10 RX ADMIN — ACETAMINOPHEN 1 MG: 500 TABLET ORAL at 06:14

## 2018-12-10 RX ADMIN — AMOXICILLIN AND CLAVULANATE POTASSIUM 1 MG: 500; 125 TABLET, FILM COATED ORAL at 09:09

## 2018-12-10 RX ADMIN — FLUTICASONE PROPIONATE AND SALMETEROL XINAFOATE 1 PUFF: 230; 21 AEROSOL, METERED RESPIRATORY (INHALATION) at 07:16

## 2018-12-10 RX ADMIN — Medication 1 EA: at 13:06

## 2018-12-10 RX ADMIN — FLUTICASONE PROPIONATE AND SALMETEROL XINAFOATE 1 PUFF: 230; 21 AEROSOL, METERED RESPIRATORY (INHALATION) at 20:14

## 2018-12-10 RX ADMIN — DOCUSATE SODIUM 1 MG: 100 CAPSULE, LIQUID FILLED ORAL at 20:39

## 2018-12-10 RX ADMIN — INSULIN LISPRO 2 UNITS: 100 INJECTION, SOLUTION INTRAVENOUS; SUBCUTANEOUS at 17:02

## 2018-12-10 RX ADMIN — Medication 1 UNITS: at 09:09

## 2018-12-10 RX ADMIN — INSULIN LISPRO 1 UNITS: 100 INJECTION, SOLUTION INTRAVENOUS; SUBCUTANEOUS at 12:00

## 2018-12-10 RX ADMIN — FLUTICASONE PROPIONATE 1 SPRAY: 50 SPRAY, METERED NASAL at 09:10

## 2018-12-10 RX ADMIN — ACETAMINOPHEN 1 MG: 500 TABLET ORAL at 18:32

## 2018-12-10 RX ADMIN — GABAPENTIN 1 MG: 300 CAPSULE ORAL at 20:39

## 2018-12-10 RX ADMIN — Medication 1: at 09:00

## 2018-12-10 RX ADMIN — DOCUSATE SODIUM 1 MG: 100 CAPSULE, LIQUID FILLED ORAL at 09:09

## 2018-12-10 RX ADMIN — RANOLAZINE 1 MG: 500 TABLET, FILM COATED, EXTENDED RELEASE ORAL at 20:39

## 2018-12-10 RX ADMIN — TIOTROPIUM BROMIDE 1 INHALATION: 18 CAPSULE ORAL; RESPIRATORY (INHALATION) at 07:16

## 2018-12-10 RX ADMIN — ASPIRIN 1 MG: 81 TABLET ORAL at 09:09

## 2018-12-10 RX ADMIN — RIVAROXABAN 1 MG: 20 TABLET, FILM COATED ORAL at 17:01

## 2018-12-10 RX ADMIN — PANTOPRAZOLE SODIUM 1 MG: 40 TABLET, DELAYED RELEASE ORAL at 09:09

## 2018-12-11 LAB
GLUCOSE BLDC GLUCOMTR-MCNC: 139 MG/DL (ref 83–110)
GLUCOSE BLDC GLUCOMTR-MCNC: 140 MG/DL (ref 83–110)
GLUCOSE BLDC GLUCOMTR-MCNC: 93 MG/DL (ref 83–110)
LEUKOCYTE ESTERASE UR AUTO RFX: NEGATIVE
SPECIFIC GRAVITY UR AUTO RFX: 1.01 (ref 1–1.03)
SQUAM EPITHELIAL CELL UR AURFX: 2 /HPF (ref 0–6)
TRANSITIONAL EPITHELIAL AU RFX: <1 /HPF

## 2018-12-11 RX ADMIN — FLUTICASONE PROPIONATE AND SALMETEROL XINAFOATE 1 PUFF: 230; 21 AEROSOL, METERED RESPIRATORY (INHALATION) at 20:01

## 2018-12-11 RX ADMIN — ASPIRIN 1 MG: 81 TABLET ORAL at 08:26

## 2018-12-11 RX ADMIN — DOCUSATE SODIUM 1 MG: 100 CAPSULE, LIQUID FILLED ORAL at 20:16

## 2018-12-11 RX ADMIN — BISOPROLOL FUMARATE 1 MG: 10 TABLET ORAL at 08:27

## 2018-12-11 RX ADMIN — LIDOCAINE 1 PATCH: 50 PATCH CUTANEOUS at 20:16

## 2018-12-11 RX ADMIN — CETIRIZINE HYDROCHLORIDE 1 MG: 10 TABLET, FILM COATED ORAL at 08:26

## 2018-12-11 RX ADMIN — SENNOSIDES 1 TAB: 8.6 TABLET, FILM COATED ORAL at 20:16

## 2018-12-11 RX ADMIN — DOCUSATE SODIUM 1 MG: 100 CAPSULE, LIQUID FILLED ORAL at 08:26

## 2018-12-11 RX ADMIN — Medication 1 UNITS: at 08:26

## 2018-12-11 RX ADMIN — TIOTROPIUM BROMIDE 1 INHALATION: 18 CAPSULE ORAL; RESPIRATORY (INHALATION) at 08:48

## 2018-12-11 RX ADMIN — FLUTICASONE PROPIONATE 1 SPRAY: 50 SPRAY, METERED NASAL at 08:27

## 2018-12-11 RX ADMIN — GABAPENTIN 1 MG: 300 CAPSULE ORAL at 20:16

## 2018-12-11 RX ADMIN — RANOLAZINE 1 MG: 500 TABLET, FILM COATED, EXTENDED RELEASE ORAL at 08:26

## 2018-12-11 RX ADMIN — RIVAROXABAN 1 MG: 20 TABLET, FILM COATED ORAL at 17:36

## 2018-12-11 RX ADMIN — RANOLAZINE 1 MG: 500 TABLET, FILM COATED, EXTENDED RELEASE ORAL at 20:16

## 2018-12-11 RX ADMIN — ACETAMINOPHEN 1 MG: 500 TABLET ORAL at 21:44

## 2018-12-11 RX ADMIN — ACETAMINOPHEN 1 MG: 500 TABLET ORAL at 08:41

## 2018-12-11 RX ADMIN — FLUTICASONE PROPIONATE AND SALMETEROL XINAFOATE 1 PUFF: 230; 21 AEROSOL, METERED RESPIRATORY (INHALATION) at 08:48

## 2018-12-11 RX ADMIN — PANTOPRAZOLE SODIUM 1 MG: 40 TABLET, DELAYED RELEASE ORAL at 08:26

## 2018-12-11 RX ADMIN — LEVOTHYROXINE SODIUM 1 MCG: 112 TABLET ORAL at 05:53

## 2018-12-11 RX ADMIN — Medication 1: at 08:27

## 2018-12-12 LAB
GLUCOSE BLDC GLUCOMTR-MCNC: 124 MG/DL (ref 83–110)
GLUCOSE BLDC GLUCOMTR-MCNC: 128 MG/DL (ref 83–110)

## 2018-12-12 RX ADMIN — DOCUSATE SODIUM 1 MG: 100 CAPSULE, LIQUID FILLED ORAL at 08:22

## 2018-12-12 RX ADMIN — RANOLAZINE 1 MG: 500 TABLET, FILM COATED, EXTENDED RELEASE ORAL at 20:45

## 2018-12-12 RX ADMIN — PANTOPRAZOLE SODIUM 1 MG: 40 TABLET, DELAYED RELEASE ORAL at 08:22

## 2018-12-12 RX ADMIN — BISOPROLOL FUMARATE 1 MG: 10 TABLET ORAL at 08:22

## 2018-12-12 RX ADMIN — DOCUSATE SODIUM 1 MG: 100 CAPSULE, LIQUID FILLED ORAL at 20:45

## 2018-12-12 RX ADMIN — Medication 1: at 08:22

## 2018-12-12 RX ADMIN — LIDOCAINE 1 PATCH: 50 PATCH CUTANEOUS at 20:46

## 2018-12-12 RX ADMIN — CETIRIZINE HYDROCHLORIDE 1 MG: 10 TABLET, FILM COATED ORAL at 08:22

## 2018-12-12 RX ADMIN — FLUTICASONE PROPIONATE AND SALMETEROL XINAFOATE 1 PUFF: 230; 21 AEROSOL, METERED RESPIRATORY (INHALATION) at 08:00

## 2018-12-12 RX ADMIN — Medication 1 UNITS: at 08:22

## 2018-12-12 RX ADMIN — ACETAMINOPHEN 1 MG: 500 TABLET ORAL at 16:43

## 2018-12-12 RX ADMIN — RANOLAZINE 1 MG: 500 TABLET, FILM COATED, EXTENDED RELEASE ORAL at 08:22

## 2018-12-12 RX ADMIN — RIVAROXABAN 1 MG: 20 TABLET, FILM COATED ORAL at 17:12

## 2018-12-12 RX ADMIN — FLUTICASONE PROPIONATE 1 SPRAY: 50 SPRAY, METERED NASAL at 08:22

## 2018-12-12 RX ADMIN — FLUTICASONE PROPIONATE AND SALMETEROL XINAFOATE 1 PUFF: 230; 21 AEROSOL, METERED RESPIRATORY (INHALATION) at 09:17

## 2018-12-12 RX ADMIN — SENNOSIDES 1 TAB: 8.6 TABLET, FILM COATED ORAL at 20:45

## 2018-12-12 RX ADMIN — TIOTROPIUM BROMIDE 1 INHALATION: 18 CAPSULE ORAL; RESPIRATORY (INHALATION) at 09:17

## 2018-12-12 RX ADMIN — TIOTROPIUM BROMIDE 1 INHALATION: 18 CAPSULE ORAL; RESPIRATORY (INHALATION) at 08:00

## 2018-12-12 RX ADMIN — ASPIRIN 1 MG: 81 TABLET ORAL at 08:22

## 2018-12-12 RX ADMIN — RAMIPRIL 1 MG: 5 CAPSULE ORAL at 12:06

## 2018-12-12 RX ADMIN — LEVOTHYROXINE SODIUM 1 MCG: 112 TABLET ORAL at 05:47

## 2018-12-12 RX ADMIN — GABAPENTIN 1 MG: 300 CAPSULE ORAL at 20:45

## 2018-12-12 RX ADMIN — ACETAMINOPHEN 1 MG: 500 TABLET ORAL at 08:21

## 2018-12-13 LAB
GLUCOSE BLDC GLUCOMTR-MCNC: 123 MG/DL (ref 83–110)
GLUCOSE BLDC GLUCOMTR-MCNC: 133 MG/DL (ref 83–110)

## 2018-12-13 RX ADMIN — TIOTROPIUM BROMIDE 1 INHALATION: 18 CAPSULE ORAL; RESPIRATORY (INHALATION) at 07:23

## 2018-12-13 RX ADMIN — ISOSORBIDE MONONITRATE 1 MG: 30 TABLET, EXTENDED RELEASE ORAL at 11:23

## 2018-12-13 RX ADMIN — RANOLAZINE 1 MG: 500 TABLET, FILM COATED, EXTENDED RELEASE ORAL at 20:51

## 2018-12-13 RX ADMIN — ASPIRIN 1 MG: 81 TABLET ORAL at 08:15

## 2018-12-13 RX ADMIN — Medication 1 UNITS: at 08:14

## 2018-12-13 RX ADMIN — LIDOCAINE 1 PATCH: 50 PATCH CUTANEOUS at 20:50

## 2018-12-13 RX ADMIN — PANTOPRAZOLE SODIUM 1 MG: 40 TABLET, DELAYED RELEASE ORAL at 08:14

## 2018-12-13 RX ADMIN — ACETAMINOPHEN 1 MG: 500 TABLET ORAL at 20:51

## 2018-12-13 RX ADMIN — DOCUSATE SODIUM 1 MG: 100 CAPSULE, LIQUID FILLED ORAL at 20:50

## 2018-12-13 RX ADMIN — BISOPROLOL FUMARATE 1 MG: 10 TABLET ORAL at 08:14

## 2018-12-13 RX ADMIN — CETIRIZINE HYDROCHLORIDE 1 MG: 10 TABLET, FILM COATED ORAL at 08:15

## 2018-12-13 RX ADMIN — FLUTICASONE PROPIONATE 1 SPRAY: 50 SPRAY, METERED NASAL at 08:15

## 2018-12-13 RX ADMIN — FLUTICASONE PROPIONATE AND SALMETEROL XINAFOATE 1 PUFF: 230; 21 AEROSOL, METERED RESPIRATORY (INHALATION) at 07:23

## 2018-12-13 RX ADMIN — LEVOTHYROXINE SODIUM 1 MCG: 112 TABLET ORAL at 05:39

## 2018-12-13 RX ADMIN — SENNOSIDES 1 TAB: 8.6 TABLET, FILM COATED ORAL at 20:50

## 2018-12-13 RX ADMIN — GABAPENTIN 1 MG: 300 CAPSULE ORAL at 20:50

## 2018-12-13 RX ADMIN — RANOLAZINE 1 MG: 500 TABLET, FILM COATED, EXTENDED RELEASE ORAL at 08:15

## 2018-12-13 RX ADMIN — DOCUSATE SODIUM 1 MG: 100 CAPSULE, LIQUID FILLED ORAL at 08:14

## 2018-12-13 RX ADMIN — ACETAMINOPHEN 1 MG: 500 TABLET ORAL at 06:09

## 2018-12-13 RX ADMIN — Medication 1: at 08:15

## 2018-12-13 RX ADMIN — RAMIPRIL 1 MG: 5 CAPSULE ORAL at 09:48

## 2018-12-13 RX ADMIN — RIVAROXABAN 1 MG: 20 TABLET, FILM COATED ORAL at 17:26

## 2018-12-14 LAB — GLUCOSE BLDC GLUCOMTR-MCNC: 139 MG/DL (ref 83–110)

## 2018-12-14 RX ADMIN — CETIRIZINE HYDROCHLORIDE 1 MG: 10 TABLET, FILM COATED ORAL at 09:04

## 2018-12-14 RX ADMIN — FLUTICASONE PROPIONATE 1 SPRAY: 50 SPRAY, METERED NASAL at 09:07

## 2018-12-14 RX ADMIN — Medication 1: at 09:07

## 2018-12-14 RX ADMIN — BISOPROLOL FUMARATE 1 MG: 10 TABLET ORAL at 09:05

## 2018-12-14 RX ADMIN — DOCUSATE SODIUM 1 MG: 100 CAPSULE, LIQUID FILLED ORAL at 09:04

## 2018-12-14 RX ADMIN — PANTOPRAZOLE SODIUM 1 MG: 40 TABLET, DELAYED RELEASE ORAL at 09:05

## 2018-12-14 RX ADMIN — MAGNESIUM HYDROXIDE 1 ML: 400 SUSPENSION ORAL at 05:40

## 2018-12-14 RX ADMIN — ISOSORBIDE MONONITRATE 1 MG: 30 TABLET, EXTENDED RELEASE ORAL at 09:06

## 2018-12-14 RX ADMIN — RAMIPRIL 1 MG: 5 CAPSULE ORAL at 09:05

## 2018-12-14 RX ADMIN — LEVOTHYROXINE SODIUM 1 MCG: 112 TABLET ORAL at 05:35

## 2018-12-14 RX ADMIN — FLUTICASONE PROPIONATE AND SALMETEROL XINAFOATE 1 PUFF: 230; 21 AEROSOL, METERED RESPIRATORY (INHALATION) at 07:26

## 2018-12-14 RX ADMIN — ASPIRIN 1 MG: 81 TABLET ORAL at 09:04

## 2018-12-14 RX ADMIN — RANOLAZINE 1 MG: 500 TABLET, FILM COATED, EXTENDED RELEASE ORAL at 09:06

## 2018-12-14 RX ADMIN — Medication 1 UNITS: at 09:05

## 2018-12-14 RX ADMIN — TIOTROPIUM BROMIDE 1 INHALATION: 18 CAPSULE ORAL; RESPIRATORY (INHALATION) at 07:26

## 2018-12-14 RX ADMIN — ACETAMINOPHEN 1 MG: 500 TABLET ORAL at 05:35

## 2019-01-23 ENCOUNTER — HOSPITAL ENCOUNTER (OUTPATIENT)
Dept: HOSPITAL 53 - M LAB REF | Age: 79
End: 2019-01-23
Attending: UROLOGY
Payer: MEDICARE

## 2019-01-23 DIAGNOSIS — N39.42: Primary | ICD-10-CM

## 2019-01-23 LAB
APPEARANCE UR: (no result)
BACTERIA UR QL AUTO: (no result)
BILIRUB UR QL STRIP.AUTO: NEGATIVE
GLUCOSE UR QL STRIP.AUTO: NEGATIVE MG/DL
HGB UR QL STRIP.AUTO: NEGATIVE
KETONES UR QL STRIP.AUTO: NEGATIVE MG/DL
LEUKOCYTE ESTERASE UR QL STRIP.AUTO: (no result)
MUCOUS THREADS URNS QL MICRO: (no result)
NITRITE UR QL STRIP.AUTO: POSITIVE
PH UR STRIP.AUTO: 6 UNITS (ref 5–9)
PROT UR QL STRIP.AUTO: (no result) MG/DL
RBC # UR AUTO: 2 /HPF (ref 0–3)
SP GR UR STRIP.AUTO: 1.01 (ref 1–1.03)
SQUAMOUS #/AREA URNS AUTO: 2 /HPF (ref 0–6)
UROBILINOGEN UR QL STRIP.AUTO: 2 MG/DL (ref 0–2)
WBC #/AREA URNS AUTO: (no result) /HPF (ref 0–3)

## 2019-03-04 ENCOUNTER — HOSPITAL ENCOUNTER (OUTPATIENT)
Dept: HOSPITAL 53 - M LAB REF | Age: 79
End: 2019-03-04
Attending: UROLOGY
Payer: MEDICARE

## 2019-03-04 DIAGNOSIS — Z87.440: ICD-10-CM

## 2019-03-04 DIAGNOSIS — N30.00: Primary | ICD-10-CM

## 2019-03-04 LAB
APPEARANCE UR: (no result)
BACTERIA UR QL AUTO: (no result)
BILIRUB UR QL STRIP.AUTO: NEGATIVE
GLUCOSE UR QL STRIP.AUTO: NEGATIVE MG/DL
HGB UR QL STRIP.AUTO: NEGATIVE
KETONES UR QL STRIP.AUTO: NEGATIVE MG/DL
LEUKOCYTE ESTERASE UR QL STRIP.AUTO: (no result)
MUCOUS THREADS URNS QL MICRO: (no result)
NITRITE UR QL STRIP.AUTO: POSITIVE
PH UR STRIP.AUTO: 6 UNITS (ref 5–9)
PROT UR QL STRIP.AUTO: (no result) MG/DL
RBC # UR AUTO: 3 /HPF (ref 0–3)
SP GR UR STRIP.AUTO: 1.01 (ref 1–1.03)
SQUAMOUS #/AREA URNS AUTO: 1 /HPF (ref 0–6)
UROBILINOGEN UR QL STRIP.AUTO: 4 MG/DL (ref 0–2)
WBC #/AREA URNS AUTO: 51 /HPF (ref 0–3)

## 2019-03-22 ENCOUNTER — HOSPITAL ENCOUNTER (EMERGENCY)
Dept: HOSPITAL 53 - M ED | Age: 79
Discharge: HOME | End: 2019-03-22
Payer: MEDICARE

## 2019-03-22 VITALS — DIASTOLIC BLOOD PRESSURE: 91 MMHG | SYSTOLIC BLOOD PRESSURE: 147 MMHG

## 2019-03-22 VITALS — WEIGHT: 280.58 LBS | BODY MASS INDEX: 46.75 KG/M2 | HEIGHT: 65 IN

## 2019-03-22 DIAGNOSIS — Z79.899: ICD-10-CM

## 2019-03-22 DIAGNOSIS — Z88.8: ICD-10-CM

## 2019-03-22 DIAGNOSIS — K59.00: Primary | ICD-10-CM

## 2019-03-22 DIAGNOSIS — Z88.2: ICD-10-CM

## 2019-03-22 DIAGNOSIS — Z88.6: ICD-10-CM

## 2019-03-22 DIAGNOSIS — I10: ICD-10-CM

## 2019-03-22 DIAGNOSIS — Z79.82: ICD-10-CM
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
